# Patient Record
Sex: FEMALE | Race: WHITE | NOT HISPANIC OR LATINO | Employment: OTHER | URBAN - METROPOLITAN AREA
[De-identification: names, ages, dates, MRNs, and addresses within clinical notes are randomized per-mention and may not be internally consistent; named-entity substitution may affect disease eponyms.]

---

## 2021-01-01 ENCOUNTER — APPOINTMENT (OUTPATIENT)
Dept: CT IMAGING | Facility: HOSPITAL | Age: 81
End: 2021-01-01

## 2021-01-01 ENCOUNTER — DOCUMENTATION (OUTPATIENT)
Dept: NEUROSURGERY | Facility: CLINIC | Age: 81
End: 2021-01-01

## 2021-01-01 ENCOUNTER — HOSPITAL ENCOUNTER (INPATIENT)
Facility: HOSPITAL | Age: 81
LOS: 6 days | End: 2021-11-01
Attending: RADIOLOGY | Admitting: INTERNAL MEDICINE

## 2021-01-01 ENCOUNTER — APPOINTMENT (OUTPATIENT)
Dept: MRI IMAGING | Facility: HOSPITAL | Age: 81
End: 2021-01-01

## 2021-01-01 ENCOUNTER — APPOINTMENT (OUTPATIENT)
Dept: GENERAL RADIOLOGY | Facility: HOSPITAL | Age: 81
End: 2021-01-01

## 2021-01-01 ENCOUNTER — HOSPITAL ENCOUNTER (INPATIENT)
Facility: HOSPITAL | Age: 81
LOS: 2 days | End: 2021-11-03
Attending: INTERNAL MEDICINE | Admitting: INTERNAL MEDICINE

## 2021-01-01 ENCOUNTER — APPOINTMENT (OUTPATIENT)
Dept: CARDIOLOGY | Facility: HOSPITAL | Age: 81
End: 2021-01-01

## 2021-01-01 ENCOUNTER — ANCILLARY PROCEDURE (OUTPATIENT)
Dept: SPEECH THERAPY | Facility: HOSPITAL | Age: 81
End: 2021-01-01

## 2021-01-01 VITALS
OXYGEN SATURATION: 91 % | HEIGHT: 66 IN | RESPIRATION RATE: 16 BRPM | TEMPERATURE: 98.2 F | DIASTOLIC BLOOD PRESSURE: 61 MMHG | HEART RATE: 78 BPM | WEIGHT: 143.3 LBS | BODY MASS INDEX: 23.03 KG/M2 | SYSTOLIC BLOOD PRESSURE: 111 MMHG

## 2021-01-01 VITALS — BODY MASS INDEX: 22.98 KG/M2 | WEIGHT: 143 LBS | HEIGHT: 66 IN

## 2021-01-01 DIAGNOSIS — R13.10 DYSPHAGIA, UNSPECIFIED TYPE: ICD-10-CM

## 2021-01-01 DIAGNOSIS — R47.01 APHASIA: Primary | ICD-10-CM

## 2021-01-01 LAB
ALBUMIN SERPL-MCNC: 3 G/DL (ref 3.5–5.2)
ALBUMIN SERPL-MCNC: 3.4 G/DL (ref 3.5–5.2)
ALBUMIN SERPL-MCNC: 3.5 G/DL (ref 3.5–5.2)
ALBUMIN/GLOB SERPL: 1.4 G/DL
ALP SERPL-CCNC: 67 U/L (ref 39–117)
ALT SERPL W P-5'-P-CCNC: 14 U/L (ref 1–33)
ANION GAP SERPL CALCULATED.3IONS-SCNC: 12 MMOL/L (ref 5–15)
ANION GAP SERPL CALCULATED.3IONS-SCNC: 12 MMOL/L (ref 5–15)
ANION GAP SERPL CALCULATED.3IONS-SCNC: 13 MMOL/L (ref 5–15)
ARTERIAL PATENCY WRIST A: ABNORMAL
ARTERIAL PATENCY WRIST A: ABNORMAL
AST SERPL-CCNC: 26 U/L (ref 1–32)
ATMOSPHERIC PRESS: ABNORMAL MM[HG]
ATMOSPHERIC PRESS: ABNORMAL MM[HG]
BASE EXCESS BLDA CALC-SCNC: -3 MMOL/L (ref 0–2)
BASE EXCESS BLDA CALC-SCNC: -3.7 MMOL/L (ref 0–2)
BASOPHILS # BLD AUTO: 0.02 10*3/MM3 (ref 0–0.2)
BASOPHILS NFR BLD AUTO: 0.1 % (ref 0–1.5)
BDY SITE: ABNORMAL
BDY SITE: ABNORMAL
BH CV ECHO MEAS - AO MAX PG (FULL): 9.5 MMHG
BH CV ECHO MEAS - AO MAX PG: 12.2 MMHG
BH CV ECHO MEAS - AO MEAN PG (FULL): 3.7 MMHG
BH CV ECHO MEAS - AO MEAN PG: 6.1 MMHG
BH CV ECHO MEAS - AO V2 MAX: 175 CM/SEC
BH CV ECHO MEAS - AO V2 MEAN: 114.7 CM/SEC
BH CV ECHO MEAS - AO V2 VTI: 38.4 CM
BH CV ECHO MEAS - BSA(HAYCOCK): 1.7 M^2
BH CV ECHO MEAS - BSA: 1.7 M^2
BH CV ECHO MEAS - BZI_BMI: 21.8 KILOGRAMS/M^2
BH CV ECHO MEAS - BZI_METRIC_HEIGHT: 167.6 CM
BH CV ECHO MEAS - BZI_METRIC_WEIGHT: 61.2 KG
BH CV ECHO MEAS - EDV(CUBED): 25.2 ML
BH CV ECHO MEAS - EDV(MOD-SP2): 26 ML
BH CV ECHO MEAS - EDV(MOD-SP4): 49 ML
BH CV ECHO MEAS - EDV(TEICH): 33.1 ML
BH CV ECHO MEAS - EF(CUBED): 65.8 %
BH CV ECHO MEAS - EF(MOD-BP): 61 %
BH CV ECHO MEAS - EF(MOD-SP2): 57.7 %
BH CV ECHO MEAS - EF(MOD-SP4): 59.2 %
BH CV ECHO MEAS - EF(TEICH): 59.1 %
BH CV ECHO MEAS - ESV(CUBED): 8.6 ML
BH CV ECHO MEAS - ESV(MOD-SP2): 11 ML
BH CV ECHO MEAS - ESV(MOD-SP4): 20 ML
BH CV ECHO MEAS - ESV(TEICH): 13.6 ML
BH CV ECHO MEAS - FS: 30.1 %
BH CV ECHO MEAS - IVS/LVPW: 0.96
BH CV ECHO MEAS - IVSD: 0.86 CM
BH CV ECHO MEAS - LA DIMENSION: 2.9 CM
BH CV ECHO MEAS - LAD MAJOR: 5.8 CM
BH CV ECHO MEAS - LAT PEAK E' VEL: 3.9 CM/SEC
BH CV ECHO MEAS - LATERAL E/E' RATIO: 51.7
BH CV ECHO MEAS - LV DIASTOLIC VOL/BSA (35-75): 29 ML/M^2
BH CV ECHO MEAS - LV IVRT: 0.08 SEC
BH CV ECHO MEAS - LV MASS(C)D: 64.9 GRAMS
BH CV ECHO MEAS - LV MASS(C)DI: 38.3 GRAMS/M^2
BH CV ECHO MEAS - LV MAX PG: 2.7 MMHG
BH CV ECHO MEAS - LV MEAN PG: 2.4 MMHG
BH CV ECHO MEAS - LV SYSTOLIC VOL/BSA (12-30): 11.8 ML/M^2
BH CV ECHO MEAS - LV V1 MAX: 82.1 CM/SEC
BH CV ECHO MEAS - LV V1 MEAN: 77 CM/SEC
BH CV ECHO MEAS - LV V1 VTI: 23.8 CM
BH CV ECHO MEAS - LVIDD: 2.9 CM
BH CV ECHO MEAS - LVIDS: 2.1 CM
BH CV ECHO MEAS - LVLD AP2: 6.1 CM
BH CV ECHO MEAS - LVLD AP4: 7 CM
BH CV ECHO MEAS - LVLS AP2: 5.1 CM
BH CV ECHO MEAS - LVLS AP4: 5.5 CM
BH CV ECHO MEAS - LVPWD: 0.89 CM
BH CV ECHO MEAS - MED PEAK E' VEL: 4.1 CM/SEC
BH CV ECHO MEAS - MEDIAL E/E' RATIO: 50.4
BH CV ECHO MEAS - MV A MAX VEL: 98.7 CM/SEC
BH CV ECHO MEAS - MV DEC SLOPE: 556.4 CM/SEC^2
BH CV ECHO MEAS - MV DEC TIME: 0.36 SEC
BH CV ECHO MEAS - MV E MAX VEL: 210.5 CM/SEC
BH CV ECHO MEAS - MV E/A: 2.1
BH CV ECHO MEAS - MV MAX PG: 18.2 MMHG
BH CV ECHO MEAS - MV MEAN PG: 8.1 MMHG
BH CV ECHO MEAS - MV P1/2T MAX VEL: 221 CM/SEC
BH CV ECHO MEAS - MV P1/2T: 116.3 MSEC
BH CV ECHO MEAS - MV V2 MAX: 213.4 CM/SEC
BH CV ECHO MEAS - MV V2 MEAN: 135.3 CM/SEC
BH CV ECHO MEAS - MV V2 VTI: 72.9 CM
BH CV ECHO MEAS - MVA P1/2T LCG: 1 CM^2
BH CV ECHO MEAS - MVA(P1/2T): 1.9 CM^2
BH CV ECHO MEAS - RAP SYSTOLE: 3 MMHG
BH CV ECHO MEAS - RVSP: 41 MMHG
BH CV ECHO MEAS - SI(CUBED): 9.8 ML/M^2
BH CV ECHO MEAS - SI(MOD-SP2): 8.9 ML/M^2
BH CV ECHO MEAS - SI(MOD-SP4): 17.1 ML/M^2
BH CV ECHO MEAS - SI(TEICH): 11.6 ML/M^2
BH CV ECHO MEAS - SV(CUBED): 16.6 ML
BH CV ECHO MEAS - SV(MOD-SP2): 15 ML
BH CV ECHO MEAS - SV(MOD-SP4): 29 ML
BH CV ECHO MEAS - SV(TEICH): 19.6 ML
BH CV ECHO MEAS - TAPSE (>1.6): 2.1 CM
BH CV ECHO MEAS - TR MAX PG: 38 MMHG
BH CV ECHO MEAS - TR MAX VEL: 309 CM/SEC
BH CV ECHO MEASUREMENTS AVERAGE E/E' RATIO: 52.63
BH CV VAS BP RIGHT ARM: NORMAL MMHG
BH CV XLRA - RV BASE: 3.4 CM
BH CV XLRA - RV LENGTH: 6.2 CM
BH CV XLRA - RV MID: 1.9 CM
BH CV XLRA - TDI S': 10.2 CM/SEC
BILIRUB SERPL-MCNC: 0.6 MG/DL (ref 0–1.2)
BODY TEMPERATURE: 37 C
BUN SERPL-MCNC: 17 MG/DL (ref 8–23)
BUN SERPL-MCNC: 18 MG/DL (ref 8–23)
BUN SERPL-MCNC: 20 MG/DL (ref 8–23)
BUN/CREAT SERPL: 23.7 (ref 7–25)
BUN/CREAT SERPL: 25 (ref 7–25)
BUN/CREAT SERPL: 31.3 (ref 7–25)
CALCIUM SPEC-SCNC: 8.2 MG/DL (ref 8.6–10.5)
CALCIUM SPEC-SCNC: 8.5 MG/DL (ref 8.6–10.5)
CALCIUM SPEC-SCNC: 8.6 MG/DL (ref 8.6–10.5)
CHLORIDE SERPL-SCNC: 109 MMOL/L (ref 98–107)
CHLORIDE SERPL-SCNC: 110 MMOL/L (ref 98–107)
CHLORIDE SERPL-SCNC: 112 MMOL/L (ref 98–107)
CHOLEST SERPL-MCNC: 141 MG/DL (ref 0–200)
CO2 BLDA-SCNC: 20.4 MMOL/L (ref 22–33)
CO2 BLDA-SCNC: 21.4 MMOL/L (ref 22–33)
CO2 SERPL-SCNC: 17 MMOL/L (ref 22–29)
CO2 SERPL-SCNC: 18 MMOL/L (ref 22–29)
CO2 SERPL-SCNC: 19 MMOL/L (ref 22–29)
COHGB MFR BLD: 0.2 % (ref 0–2)
COHGB MFR BLD: ABNORMAL %
CREAT SERPL-MCNC: 0.64 MG/DL (ref 0.57–1)
CREAT SERPL-MCNC: 0.68 MG/DL (ref 0.57–1)
CREAT SERPL-MCNC: 0.76 MG/DL (ref 0.57–1)
DEPRECATED RDW RBC AUTO: 51.8 FL (ref 37–54)
DEPRECATED RDW RBC AUTO: 52.6 FL (ref 37–54)
DEPRECATED RDW RBC AUTO: 53.9 FL (ref 37–54)
EOSINOPHIL # BLD AUTO: 0 10*3/MM3 (ref 0–0.4)
EOSINOPHIL # BLD AUTO: 0 10*3/MM3 (ref 0–0.4)
EOSINOPHIL # BLD AUTO: 0.01 10*3/MM3 (ref 0–0.4)
EOSINOPHIL NFR BLD AUTO: 0 % (ref 0.3–6.2)
EOSINOPHIL NFR BLD AUTO: 0 % (ref 0.3–6.2)
EOSINOPHIL NFR BLD AUTO: 0.1 % (ref 0.3–6.2)
EPAP: 0
ERYTHROCYTE [DISTWIDTH] IN BLOOD BY AUTOMATED COUNT: 14 % (ref 12.3–15.4)
ERYTHROCYTE [DISTWIDTH] IN BLOOD BY AUTOMATED COUNT: 14.4 % (ref 12.3–15.4)
ERYTHROCYTE [DISTWIDTH] IN BLOOD BY AUTOMATED COUNT: 14.6 % (ref 12.3–15.4)
GFR SERPL CREATININE-BSD FRML MDRD: 73 ML/MIN/1.73
GFR SERPL CREATININE-BSD FRML MDRD: 83 ML/MIN/1.73
GFR SERPL CREATININE-BSD FRML MDRD: 89 ML/MIN/1.73
GLOBULIN UR ELPH-MCNC: 2.2 GM/DL
GLUCOSE BLDC GLUCOMTR-MCNC: 109 MG/DL (ref 70–130)
GLUCOSE BLDC GLUCOMTR-MCNC: 112 MG/DL (ref 70–130)
GLUCOSE BLDC GLUCOMTR-MCNC: 113 MG/DL (ref 70–130)
GLUCOSE BLDC GLUCOMTR-MCNC: 116 MG/DL (ref 70–130)
GLUCOSE BLDC GLUCOMTR-MCNC: 117 MG/DL (ref 70–130)
GLUCOSE BLDC GLUCOMTR-MCNC: 118 MG/DL (ref 70–130)
GLUCOSE BLDC GLUCOMTR-MCNC: 122 MG/DL (ref 70–130)
GLUCOSE BLDC GLUCOMTR-MCNC: 128 MG/DL (ref 70–130)
GLUCOSE BLDC GLUCOMTR-MCNC: 129 MG/DL (ref 70–130)
GLUCOSE BLDC GLUCOMTR-MCNC: 138 MG/DL (ref 70–130)
GLUCOSE BLDC GLUCOMTR-MCNC: 141 MG/DL (ref 70–130)
GLUCOSE BLDC GLUCOMTR-MCNC: 154 MG/DL (ref 70–130)
GLUCOSE BLDC GLUCOMTR-MCNC: 156 MG/DL (ref 70–130)
GLUCOSE BLDC GLUCOMTR-MCNC: 177 MG/DL (ref 70–130)
GLUCOSE BLDC GLUCOMTR-MCNC: 68 MG/DL (ref 70–130)
GLUCOSE BLDC GLUCOMTR-MCNC: 71 MG/DL (ref 70–130)
GLUCOSE BLDC GLUCOMTR-MCNC: 83 MG/DL (ref 70–130)
GLUCOSE BLDC GLUCOMTR-MCNC: 85 MG/DL (ref 70–130)
GLUCOSE BLDC GLUCOMTR-MCNC: 87 MG/DL (ref 70–130)
GLUCOSE BLDC GLUCOMTR-MCNC: 90 MG/DL (ref 70–130)
GLUCOSE SERPL-MCNC: 116 MG/DL (ref 65–99)
GLUCOSE SERPL-MCNC: 118 MG/DL (ref 65–99)
GLUCOSE SERPL-MCNC: 124 MG/DL (ref 65–99)
HBA1C MFR BLD: 5.2 % (ref 4.8–5.6)
HCO3 BLDA-SCNC: 19.6 MMOL/L (ref 20–26)
HCO3 BLDA-SCNC: 20.5 MMOL/L (ref 20–26)
HCT VFR BLD AUTO: 33.2 % (ref 34–46.6)
HCT VFR BLD AUTO: 38.4 % (ref 34–46.6)
HCT VFR BLD AUTO: 39.6 % (ref 34–46.6)
HCT VFR BLD CALC: 33.8 % (ref 38–51)
HCT VFR BLD CALC: 37 % (ref 38–51)
HDLC SERPL-MCNC: 42 MG/DL (ref 40–60)
HGB BLD-MCNC: 11.3 G/DL (ref 12–15.9)
HGB BLD-MCNC: 12.7 G/DL (ref 12–15.9)
HGB BLD-MCNC: 13.3 G/DL (ref 12–15.9)
HGB BLDA-MCNC: 11 G/DL (ref 14–18)
HGB BLDA-MCNC: 12.1 G/DL (ref 14–18)
IMM GRANULOCYTES # BLD AUTO: 0.09 10*3/MM3 (ref 0–0.05)
IMM GRANULOCYTES # BLD AUTO: 0.13 10*3/MM3 (ref 0–0.05)
IMM GRANULOCYTES # BLD AUTO: 0.13 10*3/MM3 (ref 0–0.05)
IMM GRANULOCYTES NFR BLD AUTO: 0.5 % (ref 0–0.5)
IMM GRANULOCYTES NFR BLD AUTO: 0.7 % (ref 0–0.5)
IMM GRANULOCYTES NFR BLD AUTO: 0.7 % (ref 0–0.5)
INHALED O2 CONCENTRATION: 100 %
INHALED O2 CONCENTRATION: 40 %
IPAP: 0
LDLC SERPL CALC-MCNC: 85 MG/DL (ref 0–100)
LDLC/HDLC SERPL: 2.03 {RATIO}
LEFT ATRIUM VOLUME INDEX: 30.7 ML/M^2
LEFT ATRIUM VOLUME: 52 ML
LYMPHOCYTES # BLD AUTO: 0.42 10*3/MM3 (ref 0.7–3.1)
LYMPHOCYTES # BLD AUTO: 0.63 10*3/MM3 (ref 0.7–3.1)
LYMPHOCYTES # BLD AUTO: 0.71 10*3/MM3 (ref 0.7–3.1)
LYMPHOCYTES NFR BLD AUTO: 2.3 % (ref 19.6–45.3)
LYMPHOCYTES NFR BLD AUTO: 3.3 % (ref 19.6–45.3)
LYMPHOCYTES NFR BLD AUTO: 3.6 % (ref 19.6–45.3)
MACROCYTES BLD QL SMEAR: NORMAL
MAGNESIUM SERPL-MCNC: 2 MG/DL (ref 1.6–2.4)
MAGNESIUM SERPL-MCNC: 2 MG/DL (ref 1.6–2.4)
MAGNESIUM SERPL-MCNC: 2.3 MG/DL (ref 1.6–2.4)
MAXIMAL PREDICTED HEART RATE: 139 BPM
MCH RBC QN AUTO: 33.3 PG (ref 26.6–33)
MCH RBC QN AUTO: 33.7 PG (ref 26.6–33)
MCH RBC QN AUTO: 34.6 PG (ref 26.6–33)
MCHC RBC AUTO-ENTMCNC: 33.1 G/DL (ref 31.5–35.7)
MCHC RBC AUTO-ENTMCNC: 33.6 G/DL (ref 31.5–35.7)
MCHC RBC AUTO-ENTMCNC: 34 G/DL (ref 31.5–35.7)
MCV RBC AUTO: 100.3 FL (ref 79–97)
MCV RBC AUTO: 100.8 FL (ref 79–97)
MCV RBC AUTO: 101.5 FL (ref 79–97)
METHGB BLD QL: 0.4 % (ref 0–1.5)
METHGB BLD QL: 0.7 % (ref 0–1.5)
MODALITY: ABNORMAL
MODALITY: ABNORMAL
MONOCYTES # BLD AUTO: 1.12 10*3/MM3 (ref 0.1–0.9)
MONOCYTES # BLD AUTO: 1.12 10*3/MM3 (ref 0.1–0.9)
MONOCYTES # BLD AUTO: 1.7 10*3/MM3 (ref 0.1–0.9)
MONOCYTES NFR BLD AUTO: 5.7 % (ref 5–12)
MONOCYTES NFR BLD AUTO: 6.1 % (ref 5–12)
MONOCYTES NFR BLD AUTO: 8.8 % (ref 5–12)
NEUTROPHILS NFR BLD AUTO: 16.8 10*3/MM3 (ref 1.7–7)
NEUTROPHILS NFR BLD AUTO: 16.81 10*3/MM3 (ref 1.7–7)
NEUTROPHILS NFR BLD AUTO: 17.62 10*3/MM3 (ref 1.7–7)
NEUTROPHILS NFR BLD AUTO: 87.3 % (ref 42.7–76)
NEUTROPHILS NFR BLD AUTO: 89.8 % (ref 42.7–76)
NEUTROPHILS NFR BLD AUTO: 90.8 % (ref 42.7–76)
NOTE: ABNORMAL
NOTE: ABNORMAL
NRBC BLD AUTO-RTO: 0 /100 WBC (ref 0–0.2)
NT-PROBNP SERPL-MCNC: 5486 PG/ML (ref 0–1800)
OXYHGB MFR BLDV: 98.8 % (ref 94–99)
OXYHGB MFR BLDV: 98.9 % (ref 94–99)
PAW @ PEAK INSP FLOW SETTING VENT: 0 CMH2O
PCO2 BLDA: 29.3 MM HG (ref 35–45)
PCO2 BLDA: 30.7 MM HG (ref 35–45)
PCO2 TEMP ADJ BLD: 29.3 MM HG (ref 35–45)
PCO2 TEMP ADJ BLD: ABNORMAL MM[HG]
PH BLDA: 7.43 PH UNITS (ref 7.35–7.45)
PH BLDA: 7.43 PH UNITS (ref 7.35–7.45)
PH, TEMP CORRECTED: 7.43 PH UNITS
PH, TEMP CORRECTED: ABNORMAL
PHOSPHATE SERPL-MCNC: 3.2 MG/DL (ref 2.5–4.5)
PHOSPHATE SERPL-MCNC: 3.2 MG/DL (ref 2.5–4.5)
PHOSPHATE SERPL-MCNC: 3.8 MG/DL (ref 2.5–4.5)
PLAT MORPH BLD: NORMAL
PLATELET # BLD AUTO: 103 10*3/MM3 (ref 140–450)
PLATELET # BLD AUTO: 165 10*3/MM3 (ref 140–450)
PLATELET # BLD AUTO: 177 10*3/MM3 (ref 140–450)
PMV BLD AUTO: 10 FL (ref 6–12)
PMV BLD AUTO: 10.1 FL (ref 6–12)
PMV BLD AUTO: 11.3 FL (ref 6–12)
PO2 BLDA: 354 MM HG (ref 83–108)
PO2 BLDA: 431 MM HG (ref 83–108)
PO2 TEMP ADJ BLD: 431 MM HG (ref 83–108)
PO2 TEMP ADJ BLD: ABNORMAL MM[HG]
POTASSIUM SERPL-SCNC: 4 MMOL/L (ref 3.5–5.2)
POTASSIUM SERPL-SCNC: 4 MMOL/L (ref 3.5–5.2)
POTASSIUM SERPL-SCNC: 4.1 MMOL/L (ref 3.5–5.2)
PROT SERPL-MCNC: 5.2 G/DL (ref 6–8.5)
QT INTERVAL: 318 MS
QT INTERVAL: 380 MS
QT INTERVAL: 438 MS
QT INTERVAL: 440 MS
QT INTERVAL: 442 MS
QTC INTERVAL: 369 MS
QTC INTERVAL: 457 MS
QTC INTERVAL: 462 MS
QTC INTERVAL: 467 MS
QTC INTERVAL: 477 MS
RBC # BLD AUTO: 3.27 10*6/MM3 (ref 3.77–5.28)
RBC # BLD AUTO: 3.81 10*6/MM3 (ref 3.77–5.28)
RBC # BLD AUTO: 3.95 10*6/MM3 (ref 3.77–5.28)
SARS-COV-2 RDRP RESP QL NAA+PROBE: NORMAL
SODIUM SERPL-SCNC: 140 MMOL/L (ref 136–145)
SODIUM SERPL-SCNC: 141 MMOL/L (ref 136–145)
SODIUM SERPL-SCNC: 141 MMOL/L (ref 136–145)
STRESS TARGET HR: 118 BPM
TOTAL RATE: 0 BREATHS/MINUTE
TRIGL SERPL-MCNC: 68 MG/DL (ref 0–150)
TSH SERPL DL<=0.05 MIU/L-ACNC: 0.95 UIU/ML (ref 0.27–4.2)
VENTILATOR MODE: ABNORMAL
VLDLC SERPL-MCNC: 14 MG/DL (ref 5–40)
WBC # BLD AUTO: 18.5 10*3/MM3 (ref 3.4–10.8)
WBC # BLD AUTO: 19.24 10*3/MM3 (ref 3.4–10.8)
WBC # BLD AUTO: 19.61 10*3/MM3 (ref 3.4–10.8)
WBC MORPH BLD: NORMAL

## 2021-01-01 PROCEDURE — 94002 VENT MGMT INPAT INIT DAY: CPT

## 2021-01-01 PROCEDURE — 0BH17EZ INSERTION OF ENDOTRACHEAL AIRWAY INTO TRACHEA, VIA NATURAL OR ARTIFICIAL OPENING: ICD-10-PCS | Performed by: INTERNAL MEDICINE

## 2021-01-01 PROCEDURE — C1887 CATHETER, GUIDING: HCPCS | Performed by: RADIOLOGY

## 2021-01-01 PROCEDURE — 97162 PT EVAL MOD COMPLEX 30 MIN: CPT

## 2021-01-01 PROCEDURE — 25010000002 AMIODARONE PER 30 MG: Performed by: RADIOLOGY

## 2021-01-01 PROCEDURE — 25010000002 MIDAZOLAM PER 1 MG: Performed by: RADIOLOGY

## 2021-01-01 PROCEDURE — 70450 CT HEAD/BRAIN W/O DYE: CPT

## 2021-01-01 PROCEDURE — 93010 ELECTROCARDIOGRAM REPORT: CPT | Performed by: INTERNAL MEDICINE

## 2021-01-01 PROCEDURE — 25010000002 LORAZEPAM PER 2 MG: Performed by: INTERNAL MEDICINE

## 2021-01-01 PROCEDURE — 82375 ASSAY CARBOXYHB QUANT: CPT

## 2021-01-01 PROCEDURE — 94799 UNLISTED PULMONARY SVC/PX: CPT

## 2021-01-01 PROCEDURE — 83880 ASSAY OF NATRIURETIC PEPTIDE: CPT | Performed by: INTERNAL MEDICINE

## 2021-01-01 PROCEDURE — 99232 SBSQ HOSP IP/OBS MODERATE 35: CPT | Performed by: INTERNAL MEDICINE

## 2021-01-01 PROCEDURE — C1760 CLOSURE DEV, VASC: HCPCS | Performed by: RADIOLOGY

## 2021-01-01 PROCEDURE — 71045 X-RAY EXAM CHEST 1 VIEW: CPT

## 2021-01-01 PROCEDURE — C1773 RET DEV, INSERTABLE: HCPCS | Performed by: RADIOLOGY

## 2021-01-01 PROCEDURE — 03CG3Z7 EXTIRPATION OF MATTER FROM INTRACRANIAL ARTERY USING STENT RETRIEVER, PERCUTANEOUS APPROACH: ICD-10-PCS | Performed by: RADIOLOGY

## 2021-01-01 PROCEDURE — 93005 ELECTROCARDIOGRAM TRACING: CPT | Performed by: NURSE PRACTITIONER

## 2021-01-01 PROCEDURE — 70551 MRI BRAIN STEM W/O DYE: CPT

## 2021-01-01 PROCEDURE — 4A03X5D MEASUREMENT OF ARTERIAL FLOW, INTRACRANIAL, EXTERNAL APPROACH: ICD-10-PCS | Performed by: CLINICAL NURSE SPECIALIST

## 2021-01-01 PROCEDURE — C1894 INTRO/SHEATH, NON-LASER: HCPCS | Performed by: RADIOLOGY

## 2021-01-01 PROCEDURE — 83735 ASSAY OF MAGNESIUM: CPT | Performed by: INTERNAL MEDICINE

## 2021-01-01 PROCEDURE — 99231 SBSQ HOSP IP/OBS SF/LOW 25: CPT | Performed by: INTERNAL MEDICINE

## 2021-01-01 PROCEDURE — 97116 GAIT TRAINING THERAPY: CPT

## 2021-01-01 PROCEDURE — 99222 1ST HOSP IP/OBS MODERATE 55: CPT | Performed by: INTERNAL MEDICINE

## 2021-01-01 PROCEDURE — 74018 RADEX ABDOMEN 1 VIEW: CPT

## 2021-01-01 PROCEDURE — 0 IODIXANOL PER 1 ML: Performed by: RADIOLOGY

## 2021-01-01 PROCEDURE — 70498 CT ANGIOGRAPHY NECK: CPT

## 2021-01-01 PROCEDURE — 99291 CRITICAL CARE FIRST HOUR: CPT | Performed by: INTERNAL MEDICINE

## 2021-01-01 PROCEDURE — 92610 EVALUATE SWALLOWING FUNCTION: CPT

## 2021-01-01 PROCEDURE — 97165 OT EVAL LOW COMPLEX 30 MIN: CPT

## 2021-01-01 PROCEDURE — C1769 GUIDE WIRE: HCPCS | Performed by: RADIOLOGY

## 2021-01-01 PROCEDURE — 25010000002 FUROSEMIDE PER 20 MG: Performed by: NURSE PRACTITIONER

## 2021-01-01 PROCEDURE — 84443 ASSAY THYROID STIM HORMONE: CPT | Performed by: INTERNAL MEDICINE

## 2021-01-01 PROCEDURE — 82962 GLUCOSE BLOOD TEST: CPT

## 2021-01-01 PROCEDURE — 25010000002 FENTANYL CITRATE (PF) 50 MCG/ML SOLUTION: Performed by: RADIOLOGY

## 2021-01-01 PROCEDURE — 99232 SBSQ HOSP IP/OBS MODERATE 35: CPT | Performed by: PSYCHIATRY & NEUROLOGY

## 2021-01-01 PROCEDURE — 83050 HGB METHEMOGLOBIN QUAN: CPT

## 2021-01-01 PROCEDURE — 92523 SPEECH SOUND LANG COMPREHEN: CPT | Performed by: SPEECH-LANGUAGE PATHOLOGIST

## 2021-01-01 PROCEDURE — 99231 SBSQ HOSP IP/OBS SF/LOW 25: CPT | Performed by: PSYCHIATRY & NEUROLOGY

## 2021-01-01 PROCEDURE — 25010000002 MORPHINE PER 10 MG: Performed by: FAMILY MEDICINE

## 2021-01-01 PROCEDURE — 70496 CT ANGIOGRAPHY HEAD: CPT

## 2021-01-01 PROCEDURE — 84100 ASSAY OF PHOSPHORUS: CPT | Performed by: INTERNAL MEDICINE

## 2021-01-01 PROCEDURE — 93306 TTE W/DOPPLER COMPLETE: CPT

## 2021-01-01 PROCEDURE — 80069 RENAL FUNCTION PANEL: CPT | Performed by: INTERNAL MEDICINE

## 2021-01-01 PROCEDURE — 25010000002 MORPHINE PER 10 MG: Performed by: INTERNAL MEDICINE

## 2021-01-01 PROCEDURE — 85025 COMPLETE CBC W/AUTO DIFF WBC: CPT | Performed by: INTERNAL MEDICINE

## 2021-01-01 PROCEDURE — 97530 THERAPEUTIC ACTIVITIES: CPT

## 2021-01-01 PROCEDURE — 99233 SBSQ HOSP IP/OBS HIGH 50: CPT | Performed by: INTERNAL MEDICINE

## 2021-01-01 PROCEDURE — 25010000002 LORAZEPAM PER 2 MG: Performed by: NURSE PRACTITIONER

## 2021-01-01 PROCEDURE — 61645 PERQ ART M-THROMBECT &/NFS: CPT | Performed by: RADIOLOGY

## 2021-01-01 PROCEDURE — 92507 TX SP LANG VOICE COMM INDIV: CPT

## 2021-01-01 PROCEDURE — 25010000002 FENTANYL CITRATE (PF) 2500 MCG/50ML SOLUTION: Performed by: INTERNAL MEDICINE

## 2021-01-01 PROCEDURE — C2628 CATHETER, OCCLUSION: HCPCS | Performed by: RADIOLOGY

## 2021-01-01 PROCEDURE — 94003 VENT MGMT INPAT SUBQ DAY: CPT

## 2021-01-01 PROCEDURE — 36600 WITHDRAWAL OF ARTERIAL BLOOD: CPT

## 2021-01-01 PROCEDURE — 25010000002 KETOROLAC TROMETHAMINE PER 15 MG: Performed by: NURSE PRACTITIONER

## 2021-01-01 PROCEDURE — 25010000002 LORAZEPAM PER 2 MG

## 2021-01-01 PROCEDURE — 05HY33Z INSERTION OF INFUSION DEVICE INTO UPPER VEIN, PERCUTANEOUS APPROACH: ICD-10-PCS | Performed by: INTERNAL MEDICINE

## 2021-01-01 PROCEDURE — 93005 ELECTROCARDIOGRAM TRACING: CPT | Performed by: INTERNAL MEDICINE

## 2021-01-01 PROCEDURE — B317YZZ FLUOROSCOPY OF LEFT INTERNAL CAROTID ARTERY USING OTHER CONTRAST: ICD-10-PCS | Performed by: RADIOLOGY

## 2021-01-01 PROCEDURE — 82805 BLOOD GASES W/O2 SATURATION: CPT

## 2021-01-01 PROCEDURE — 97535 SELF CARE MNGMENT TRAINING: CPT

## 2021-01-01 PROCEDURE — 85007 BL SMEAR W/DIFF WBC COUNT: CPT | Performed by: INTERNAL MEDICINE

## 2021-01-01 PROCEDURE — 99232 SBSQ HOSP IP/OBS MODERATE 35: CPT | Performed by: NURSE PRACTITIONER

## 2021-01-01 PROCEDURE — 03CL3Z7 EXTIRPATION OF MATTER FROM LEFT INTERNAL CAROTID ARTERY USING STENT RETRIEVER, PERCUTANEOUS APPROACH: ICD-10-PCS | Performed by: RADIOLOGY

## 2021-01-01 PROCEDURE — 99291 CRITICAL CARE FIRST HOUR: CPT

## 2021-01-01 PROCEDURE — 92610 EVALUATE SWALLOWING FUNCTION: CPT | Performed by: SPEECH-LANGUAGE PATHOLOGIST

## 2021-01-01 PROCEDURE — 80053 COMPREHEN METABOLIC PANEL: CPT | Performed by: INTERNAL MEDICINE

## 2021-01-01 PROCEDURE — 80061 LIPID PANEL: CPT

## 2021-01-01 PROCEDURE — 87635 SARS-COV-2 COVID-19 AMP PRB: CPT | Performed by: RADIOLOGY

## 2021-01-01 PROCEDURE — 0 IOPAMIDOL PER 1 ML: Performed by: INTERNAL MEDICINE

## 2021-01-01 PROCEDURE — 92612 ENDOSCOPY SWALLOW (FEES) VID: CPT

## 2021-01-01 PROCEDURE — 25010000002 AMIODARONE IN DEXTROSE 5% 360-4.14 MG/200ML-% SOLUTION

## 2021-01-01 PROCEDURE — C1894 INTRO/SHEATH, NON-LASER: HCPCS

## 2021-01-01 PROCEDURE — 25010000002 ALTEPLASE 2 MG RECONSTITUTED SOLUTION: Performed by: RADIOLOGY

## 2021-01-01 PROCEDURE — 5A1945Z RESPIRATORY VENTILATION, 24-96 CONSECUTIVE HOURS: ICD-10-PCS | Performed by: INTERNAL MEDICINE

## 2021-01-01 PROCEDURE — 25010000002 MIDAZOLAM PER 1 MG

## 2021-01-01 PROCEDURE — 83036 HEMOGLOBIN GLYCOSYLATED A1C: CPT

## 2021-01-01 PROCEDURE — 25010000002 MORPHINE PER 10 MG: Performed by: NURSE PRACTITIONER

## 2021-01-01 PROCEDURE — B316YZZ FLUOROSCOPY OF RIGHT INTERNAL CAROTID ARTERY USING OTHER CONTRAST: ICD-10-PCS | Performed by: RADIOLOGY

## 2021-01-01 PROCEDURE — 25010000002 AMIODARONE IN DEXTROSE 5% 360-4.14 MG/200ML-% SOLUTION: Performed by: NURSE PRACTITIONER

## 2021-01-01 PROCEDURE — C1751 CATH, INF, PER/CENT/MIDLINE: HCPCS

## 2021-01-01 PROCEDURE — 93306 TTE W/DOPPLER COMPLETE: CPT | Performed by: INTERNAL MEDICINE

## 2021-01-01 DEVICE — STNT RETRV SOLITAIREX REVASCULARIZATION 3X20MM 10MM 150CM: Type: IMPLANTABLE DEVICE | Status: FUNCTIONAL

## 2021-01-01 DEVICE — STNT RETRV SOLITAIREX REVASCULARIZATION 4X40MM 130CM: Type: IMPLANTABLE DEVICE | Status: FUNCTIONAL

## 2021-01-01 RX ORDER — GLYCOPYRROLATE 0.2 MG/ML
0.4 INJECTION INTRAMUSCULAR; INTRAVENOUS EVERY 4 HOURS PRN
Status: CANCELLED | OUTPATIENT
Start: 2021-01-01

## 2021-01-01 RX ORDER — POLYVINYL ALCOHOL 14 MG/ML
2 SOLUTION/ DROPS OPHTHALMIC
Status: DISCONTINUED | OUTPATIENT
Start: 2021-01-01 | End: 2021-01-01 | Stop reason: HOSPADM

## 2021-01-01 RX ORDER — SODIUM CHLORIDE 0.9 % (FLUSH) 0.9 %
10 SYRINGE (ML) INJECTION EVERY 12 HOURS SCHEDULED
Status: CANCELLED | OUTPATIENT
Start: 2021-01-01

## 2021-01-01 RX ORDER — MIDAZOLAM HYDROCHLORIDE 1 MG/ML
INJECTION INTRAMUSCULAR; INTRAVENOUS AS NEEDED
Status: DISCONTINUED | OUTPATIENT
Start: 2021-01-01 | End: 2021-01-01 | Stop reason: HOSPADM

## 2021-01-01 RX ORDER — LEVOTHYROXINE SODIUM 20 UG/ML
120 INJECTION, SOLUTION INTRAVENOUS
Status: CANCELLED | OUTPATIENT
Start: 2021-01-01

## 2021-01-01 RX ORDER — AMIODARONE HYDROCHLORIDE 200 MG/1
200 TABLET ORAL EVERY 8 HOURS SCHEDULED
Status: DISCONTINUED | OUTPATIENT
Start: 2021-01-01 | End: 2021-01-01

## 2021-01-01 RX ORDER — AMIODARONE HYDROCHLORIDE 200 MG/1
200 TABLET ORAL DAILY
Status: DISCONTINUED | OUTPATIENT
Start: 2021-11-18 | End: 2021-01-01

## 2021-01-01 RX ORDER — LORAZEPAM 2 MG/ML
0.5 INJECTION INTRAMUSCULAR 2 TIMES DAILY
Status: DISCONTINUED | OUTPATIENT
Start: 2021-01-01 | End: 2021-01-01

## 2021-01-01 RX ORDER — ASPIRIN 300 MG/1
300 SUPPOSITORY RECTAL DAILY
Status: DISCONTINUED | OUTPATIENT
Start: 2021-01-01 | End: 2021-01-01

## 2021-01-01 RX ORDER — DEXTROSE MONOHYDRATE 25 G/50ML
25 INJECTION, SOLUTION INTRAVENOUS
Status: DISCONTINUED | OUTPATIENT
Start: 2021-01-01 | End: 2021-01-01 | Stop reason: HOSPADM

## 2021-01-01 RX ORDER — IODIXANOL 320 MG/ML
INJECTION, SOLUTION INTRAVASCULAR AS NEEDED
Status: DISCONTINUED | OUTPATIENT
Start: 2021-01-01 | End: 2021-01-01 | Stop reason: HOSPADM

## 2021-01-01 RX ORDER — AMIODARONE HYDROCHLORIDE 50 MG/ML
INJECTION, SOLUTION INTRAVENOUS AS NEEDED
Status: DISCONTINUED | OUTPATIENT
Start: 2021-01-01 | End: 2021-01-01 | Stop reason: HOSPADM

## 2021-01-01 RX ORDER — MORPHINE SULFATE 4 MG/ML
4 INJECTION, SOLUTION INTRAMUSCULAR; INTRAVENOUS
Status: CANCELLED | OUTPATIENT
Start: 2021-01-01 | End: 2021-11-10

## 2021-01-01 RX ORDER — LEVOTHYROXINE SODIUM 0.15 MG/1
150 TABLET ORAL
Status: DISCONTINUED | OUTPATIENT
Start: 2021-01-01 | End: 2021-01-01

## 2021-01-01 RX ORDER — SODIUM CHLORIDE 0.9 % (FLUSH) 0.9 %
10 SYRINGE (ML) INJECTION AS NEEDED
Status: CANCELLED | OUTPATIENT
Start: 2021-01-01

## 2021-01-01 RX ORDER — SODIUM CHLORIDE 0.9 % (FLUSH) 0.9 %
10 SYRINGE (ML) INJECTION EVERY 12 HOURS SCHEDULED
Status: DISCONTINUED | OUTPATIENT
Start: 2021-01-01 | End: 2021-01-01

## 2021-01-01 RX ORDER — LEVOTHYROXINE SODIUM 0.15 MG/1
150 TABLET ORAL DAILY
COMMUNITY

## 2021-01-01 RX ORDER — FENTANYL CITRATE 50 UG/ML
INJECTION, SOLUTION INTRAMUSCULAR; INTRAVENOUS AS NEEDED
Status: DISCONTINUED | OUTPATIENT
Start: 2021-01-01 | End: 2021-01-01 | Stop reason: HOSPADM

## 2021-01-01 RX ORDER — SODIUM CHLORIDE 0.9 % (FLUSH) 0.9 %
10 SYRINGE (ML) INJECTION AS NEEDED
Status: DISCONTINUED | OUTPATIENT
Start: 2021-01-01 | End: 2021-01-01

## 2021-01-01 RX ORDER — TIZANIDINE 4 MG/1
4 TABLET ORAL 2 TIMES DAILY
COMMUNITY

## 2021-01-01 RX ORDER — MIDAZOLAM HYDROCHLORIDE 1 MG/ML
INJECTION INTRAMUSCULAR; INTRAVENOUS
Status: COMPLETED
Start: 2021-01-01 | End: 2021-01-01

## 2021-01-01 RX ORDER — SCOLOPAMINE TRANSDERMAL SYSTEM 1 MG/1
1 PATCH, EXTENDED RELEASE TRANSDERMAL
Status: DISCONTINUED | OUTPATIENT
Start: 2021-01-01 | End: 2021-01-01 | Stop reason: HOSPADM

## 2021-01-01 RX ORDER — ASPIRIN 81 MG/1
81 TABLET, CHEWABLE ORAL DAILY
Status: DISCONTINUED | OUTPATIENT
Start: 2021-01-01 | End: 2021-01-01

## 2021-01-01 RX ORDER — LISINOPRIL 20 MG/1
20 TABLET ORAL DAILY
COMMUNITY

## 2021-01-01 RX ORDER — MORPHINE SULFATE 4 MG/ML
4 INJECTION, SOLUTION INTRAMUSCULAR; INTRAVENOUS
Status: DISCONTINUED | OUTPATIENT
Start: 2021-01-01 | End: 2021-01-01

## 2021-01-01 RX ORDER — LORAZEPAM 2 MG/ML
1 INJECTION INTRAMUSCULAR EVERY 4 HOURS PRN
Status: CANCELLED | OUTPATIENT
Start: 2021-01-01 | End: 2021-11-05

## 2021-01-01 RX ORDER — LORAZEPAM 2 MG/ML
1 INJECTION INTRAMUSCULAR EVERY 4 HOURS PRN
Status: DISCONTINUED | OUTPATIENT
Start: 2021-01-01 | End: 2021-01-01 | Stop reason: HOSPADM

## 2021-01-01 RX ORDER — HALOPERIDOL 5 MG/ML
1 INJECTION INTRAMUSCULAR EVERY 4 HOURS PRN
Status: DISCONTINUED | OUTPATIENT
Start: 2021-01-01 | End: 2021-01-01 | Stop reason: HOSPADM

## 2021-01-01 RX ORDER — LORAZEPAM 2 MG/ML
INJECTION INTRAMUSCULAR
Status: COMPLETED
Start: 2021-01-01 | End: 2021-01-01

## 2021-01-01 RX ORDER — ATORVASTATIN CALCIUM 40 MG/1
40 TABLET, FILM COATED ORAL NIGHTLY
COMMUNITY

## 2021-01-01 RX ORDER — METOPROLOL TARTRATE 5 MG/5ML
5 INJECTION INTRAVENOUS EVERY 6 HOURS
Status: DISCONTINUED | OUTPATIENT
Start: 2021-01-01 | End: 2021-01-01

## 2021-01-01 RX ORDER — ZOLPIDEM TARTRATE 10 MG/1
10 TABLET ORAL NIGHTLY PRN
COMMUNITY

## 2021-01-01 RX ORDER — SODIUM CHLORIDE 9 MG/ML
9 INJECTION, SOLUTION INTRAVENOUS CONTINUOUS
Status: DISCONTINUED | OUTPATIENT
Start: 2021-01-01 | End: 2021-01-01

## 2021-01-01 RX ORDER — CHLORHEXIDINE GLUCONATE 0.12 MG/ML
15 RINSE ORAL EVERY 12 HOURS SCHEDULED
Status: DISCONTINUED | OUTPATIENT
Start: 2021-01-01 | End: 2021-01-01

## 2021-01-01 RX ORDER — AMIODARONE HYDROCHLORIDE 200 MG/1
200 TABLET ORAL EVERY 12 HOURS
Status: DISCONTINUED | OUTPATIENT
Start: 2021-11-04 | End: 2021-01-01

## 2021-01-01 RX ORDER — SODIUM CHLORIDE 0.9 % (FLUSH) 0.9 %
10 SYRINGE (ML) INJECTION EVERY 12 HOURS SCHEDULED
Status: DISCONTINUED | OUTPATIENT
Start: 2021-01-01 | End: 2021-01-01 | Stop reason: HOSPADM

## 2021-01-01 RX ORDER — DEXTROSE MONOHYDRATE 25 G/50ML
25 INJECTION, SOLUTION INTRAVENOUS
Status: CANCELLED | OUTPATIENT
Start: 2021-01-01

## 2021-01-01 RX ORDER — FENTANYL CITRATE-0.9 % NACL/PF 10 MCG/ML
50-300 PLASTIC BAG, INJECTION (ML) INTRAVENOUS
Status: CANCELLED | OUTPATIENT
Start: 2021-01-01 | End: 2021-11-04

## 2021-01-01 RX ORDER — GLYCOPYRROLATE 0.2 MG/ML
0.2 INJECTION INTRAMUSCULAR; INTRAVENOUS EVERY 6 HOURS PRN
Status: DISCONTINUED | OUTPATIENT
Start: 2021-01-01 | End: 2021-01-01 | Stop reason: HOSPADM

## 2021-01-01 RX ORDER — FUROSEMIDE 10 MG/ML
20 INJECTION INTRAMUSCULAR; INTRAVENOUS EVERY 6 HOURS
Status: DISCONTINUED | OUTPATIENT
Start: 2021-01-01 | End: 2021-01-01 | Stop reason: HOSPADM

## 2021-01-01 RX ORDER — SODIUM CHLORIDE 9 MG/ML
50 INJECTION, SOLUTION INTRAVENOUS CONTINUOUS
Status: DISCONTINUED | OUTPATIENT
Start: 2021-01-01 | End: 2021-01-01

## 2021-01-01 RX ORDER — AMIODARONE HYDROCHLORIDE 200 MG/1
200 TABLET ORAL EVERY 12 HOURS
Status: DISCONTINUED | OUTPATIENT
Start: 2021-11-05 | End: 2021-01-01

## 2021-01-01 RX ORDER — ATORVASTATIN CALCIUM 40 MG/1
80 TABLET, FILM COATED ORAL NIGHTLY
Status: DISCONTINUED | OUTPATIENT
Start: 2021-01-01 | End: 2021-01-01

## 2021-01-01 RX ORDER — ETOMIDATE 2 MG/ML
INJECTION INTRAVENOUS
Status: COMPLETED
Start: 2021-01-01 | End: 2021-01-01

## 2021-01-01 RX ORDER — MORPHINE SULFATE 2 MG/ML
6 INJECTION, SOLUTION INTRAMUSCULAR; INTRAVENOUS EVERY 4 HOURS
Status: DISCONTINUED | OUTPATIENT
Start: 2021-01-01 | End: 2021-01-01 | Stop reason: HOSPADM

## 2021-01-01 RX ORDER — LORAZEPAM 2 MG/ML
2 INJECTION INTRAMUSCULAR
Status: DISCONTINUED | OUTPATIENT
Start: 2021-01-01 | End: 2021-01-01 | Stop reason: HOSPADM

## 2021-01-01 RX ORDER — LEVOTHYROXINE SODIUM 20 UG/ML
120 INJECTION, SOLUTION INTRAVENOUS
Status: DISCONTINUED | OUTPATIENT
Start: 2021-01-01 | End: 2021-01-01 | Stop reason: HOSPADM

## 2021-01-01 RX ORDER — METOPROLOL TARTRATE 5 MG/5ML
2.5 INJECTION INTRAVENOUS EVERY 6 HOURS
Status: DISCONTINUED | OUTPATIENT
Start: 2021-01-01 | End: 2021-01-01

## 2021-01-01 RX ORDER — KETOROLAC TROMETHAMINE 15 MG/ML
15 INJECTION, SOLUTION INTRAMUSCULAR; INTRAVENOUS EVERY 6 HOURS PRN
Status: DISCONTINUED | OUTPATIENT
Start: 2021-01-01 | End: 2021-01-01 | Stop reason: HOSPADM

## 2021-01-01 RX ORDER — AMIODARONE HYDROCHLORIDE 200 MG/1
200 TABLET ORAL EVERY 8 HOURS
Status: DISCONTINUED | OUTPATIENT
Start: 2021-01-01 | End: 2021-01-01

## 2021-01-01 RX ORDER — SULFAMETHOXAZOLE AND TRIMETHOPRIM 800; 160 MG/1; MG/1
1 TABLET ORAL DAILY
COMMUNITY

## 2021-01-01 RX ORDER — MORPHINE SULFATE 4 MG/ML
4 INJECTION, SOLUTION INTRAMUSCULAR; INTRAVENOUS
Status: DISCONTINUED | OUTPATIENT
Start: 2021-01-01 | End: 2021-01-01 | Stop reason: HOSPADM

## 2021-01-01 RX ORDER — FUROSEMIDE 10 MG/ML
20 INJECTION INTRAMUSCULAR; INTRAVENOUS EVERY 6 HOURS PRN
Status: DISCONTINUED | OUTPATIENT
Start: 2021-01-01 | End: 2021-01-01 | Stop reason: HOSPADM

## 2021-01-01 RX ORDER — HYDROCODONE BITARTRATE AND ACETAMINOPHEN 5; 325 MG/1; MG/1
1 TABLET ORAL 2 TIMES DAILY
COMMUNITY

## 2021-01-01 RX ORDER — OMEPRAZOLE 40 MG/1
40 CAPSULE, DELAYED RELEASE ORAL DAILY
COMMUNITY

## 2021-01-01 RX ORDER — AMLODIPINE BESYLATE 5 MG/1
5 TABLET ORAL DAILY
COMMUNITY

## 2021-01-01 RX ORDER — LIDOCAINE HYDROCHLORIDE 10 MG/ML
INJECTION, SOLUTION EPIDURAL; INFILTRATION; INTRACAUDAL; PERINEURAL AS NEEDED
Status: DISCONTINUED | OUTPATIENT
Start: 2021-01-01 | End: 2021-01-01 | Stop reason: HOSPADM

## 2021-01-01 RX ORDER — MORPHINE SULFATE 4 MG/ML
4 INJECTION, SOLUTION INTRAMUSCULAR; INTRAVENOUS EVERY 6 HOURS
Status: DISCONTINUED | OUTPATIENT
Start: 2021-01-01 | End: 2021-01-01

## 2021-01-01 RX ORDER — ACETAMINOPHEN 650 MG/1
650 SUPPOSITORY RECTAL EVERY 4 HOURS PRN
Status: DISCONTINUED | OUTPATIENT
Start: 2021-01-01 | End: 2021-01-01 | Stop reason: HOSPADM

## 2021-01-01 RX ORDER — FAMOTIDINE 10 MG/ML
20 INJECTION, SOLUTION INTRAVENOUS 2 TIMES DAILY
Status: DISCONTINUED | OUTPATIENT
Start: 2021-01-01 | End: 2021-01-01

## 2021-01-01 RX ORDER — MORPHINE SULFATE 2 MG/ML
6 INJECTION, SOLUTION INTRAMUSCULAR; INTRAVENOUS
Status: DISCONTINUED | OUTPATIENT
Start: 2021-01-01 | End: 2021-01-01 | Stop reason: HOSPADM

## 2021-01-01 RX ORDER — ONDANSETRON 2 MG/ML
4 INJECTION INTRAMUSCULAR; INTRAVENOUS EVERY 6 HOURS PRN
Status: DISCONTINUED | OUTPATIENT
Start: 2021-01-01 | End: 2021-01-01 | Stop reason: HOSPADM

## 2021-01-01 RX ORDER — LORAZEPAM 2 MG/ML
0.5 INJECTION INTRAMUSCULAR EVERY 4 HOURS PRN
Status: DISCONTINUED | OUTPATIENT
Start: 2021-01-01 | End: 2021-01-01

## 2021-01-01 RX ORDER — GLYCOPYRROLATE 0.2 MG/ML
0.4 INJECTION INTRAMUSCULAR; INTRAVENOUS EVERY 4 HOURS PRN
Status: DISCONTINUED | OUTPATIENT
Start: 2021-01-01 | End: 2021-01-01 | Stop reason: HOSPADM

## 2021-01-01 RX ORDER — DIGOXIN 125 MCG
125 TABLET ORAL
Status: DISCONTINUED | OUTPATIENT
Start: 2021-01-01 | End: 2021-01-01

## 2021-01-01 RX ORDER — AMIODARONE HYDROCHLORIDE 200 MG/1
200 TABLET ORAL DAILY
Status: DISCONTINUED | OUTPATIENT
Start: 2021-11-19 | End: 2021-01-01

## 2021-01-01 RX ORDER — LORAZEPAM 2 MG/ML
0.5 INJECTION INTRAMUSCULAR ONCE
Status: COMPLETED | OUTPATIENT
Start: 2021-01-01 | End: 2021-01-01

## 2021-01-01 RX ORDER — BISACODYL 10 MG
10 SUPPOSITORY, RECTAL RECTAL DAILY PRN
Status: DISCONTINUED | OUTPATIENT
Start: 2021-01-01 | End: 2021-01-01 | Stop reason: HOSPADM

## 2021-01-01 RX ORDER — LORAZEPAM 2 MG/ML
0.5 INJECTION INTRAMUSCULAR EVERY 4 HOURS
Status: DISCONTINUED | OUTPATIENT
Start: 2021-01-01 | End: 2021-01-01 | Stop reason: HOSPADM

## 2021-01-01 RX ORDER — SODIUM CHLORIDE 0.9 % (FLUSH) 0.9 %
10 SYRINGE (ML) INJECTION AS NEEDED
Status: DISCONTINUED | OUTPATIENT
Start: 2021-01-01 | End: 2021-01-01 | Stop reason: HOSPADM

## 2021-01-01 RX ORDER — AMIODARONE HYDROCHLORIDE 200 MG/1
200 TABLET ORAL ONCE
Status: DISCONTINUED | OUTPATIENT
Start: 2021-01-01 | End: 2021-01-01

## 2021-01-01 RX ADMIN — AMIODARONE HYDROCHLORIDE 200 MG: 200 TABLET ORAL at 12:59

## 2021-01-01 RX ADMIN — ASPIRIN 81 MG: 81 TABLET, CHEWABLE ORAL at 08:56

## 2021-01-01 RX ADMIN — MORPHINE SULFATE 4 MG: 4 INJECTION, SOLUTION INTRAMUSCULAR; INTRAVENOUS at 01:06

## 2021-01-01 RX ADMIN — AMIODARONE HYDROCHLORIDE 0.5 MG/MIN: 1.8 INJECTION, SOLUTION INTRAVENOUS at 11:03

## 2021-01-01 RX ADMIN — CHLORHEXIDINE GLUCONATE 15 ML: 1.2 SOLUTION ORAL at 21:19

## 2021-01-01 RX ADMIN — MORPHINE SULFATE 4 MG: 4 INJECTION, SOLUTION INTRAMUSCULAR; INTRAVENOUS at 21:26

## 2021-01-01 RX ADMIN — LORAZEPAM 0.5 MG: 2 INJECTION INTRAMUSCULAR; INTRAVENOUS at 21:26

## 2021-01-01 RX ADMIN — MORPHINE SULFATE 6 MG: 2 INJECTION, SOLUTION INTRAMUSCULAR; INTRAVENOUS at 21:25

## 2021-01-01 RX ADMIN — GLYCOPYRROLATE 0.4 MG: 0.2 INJECTION INTRAMUSCULAR; INTRAVENOUS at 10:20

## 2021-01-01 RX ADMIN — ASPIRIN 81 MG: 81 TABLET, CHEWABLE ORAL at 08:17

## 2021-01-01 RX ADMIN — FENTANYL CITRATE 50 MCG/HR: 50 INJECTION INTRAVENOUS at 19:40

## 2021-01-01 RX ADMIN — CHLORHEXIDINE GLUCONATE 15 ML: 1.2 SOLUTION ORAL at 08:57

## 2021-01-01 RX ADMIN — SCOPALAMINE 1 PATCH: 1 PATCH, EXTENDED RELEASE TRANSDERMAL at 15:52

## 2021-01-01 RX ADMIN — IOPAMIDOL 75 ML: 755 INJECTION, SOLUTION INTRAVENOUS at 17:28

## 2021-01-01 RX ADMIN — AMIODARONE HYDROCHLORIDE 1 MG/MIN: 1.8 INJECTION, SOLUTION INTRAVENOUS at 23:34

## 2021-01-01 RX ADMIN — METOPROLOL TARTRATE 12.5 MG: 25 TABLET, FILM COATED ORAL at 22:16

## 2021-01-01 RX ADMIN — ATORVASTATIN CALCIUM 80 MG: 40 TABLET, FILM COATED ORAL at 20:50

## 2021-01-01 RX ADMIN — SODIUM CHLORIDE, PRESERVATIVE FREE 10 ML: 5 INJECTION INTRAVENOUS at 11:09

## 2021-01-01 RX ADMIN — METOPROLOL TARTRATE 2.5 MG: 5 INJECTION INTRAVENOUS at 00:17

## 2021-01-01 RX ADMIN — AMIODARONE HYDROCHLORIDE 200 MG: 200 TABLET ORAL at 13:00

## 2021-01-01 RX ADMIN — MORPHINE SULFATE 4 MG: 4 INJECTION, SOLUTION INTRAMUSCULAR; INTRAVENOUS at 10:24

## 2021-01-01 RX ADMIN — SODIUM CHLORIDE 50 ML/HR: 9 INJECTION, SOLUTION INTRAVENOUS at 05:40

## 2021-01-01 RX ADMIN — ATORVASTATIN CALCIUM 80 MG: 40 TABLET, FILM COATED ORAL at 21:19

## 2021-01-01 RX ADMIN — METOPROLOL TARTRATE 12.5 MG: 25 TABLET, FILM COATED ORAL at 20:49

## 2021-01-01 RX ADMIN — LORAZEPAM 0.5 MG: 2 INJECTION INTRAMUSCULAR; INTRAVENOUS at 09:51

## 2021-01-01 RX ADMIN — SODIUM CHLORIDE 50 ML/HR: 9 INJECTION, SOLUTION INTRAVENOUS at 08:40

## 2021-01-01 RX ADMIN — LORAZEPAM 1 MG: 2 INJECTION INTRAMUSCULAR; INTRAVENOUS at 19:42

## 2021-01-01 RX ADMIN — SODIUM CHLORIDE, PRESERVATIVE FREE 10 ML: 5 INJECTION INTRAVENOUS at 21:35

## 2021-01-01 RX ADMIN — METOPROLOL TARTRATE 12.5 MG: 25 TABLET, FILM COATED ORAL at 21:19

## 2021-01-01 RX ADMIN — ASPIRIN 81 MG: 81 TABLET, CHEWABLE ORAL at 08:07

## 2021-01-01 RX ADMIN — FAMOTIDINE 20 MG: 10 INJECTION, SOLUTION INTRAVENOUS at 08:56

## 2021-01-01 RX ADMIN — FUROSEMIDE 20 MG: 10 INJECTION INTRAMUSCULAR; INTRAVENOUS at 15:57

## 2021-01-01 RX ADMIN — AMIODARONE HYDROCHLORIDE 200 MG: 200 TABLET ORAL at 05:58

## 2021-01-01 RX ADMIN — FUROSEMIDE 20 MG: 10 INJECTION INTRAMUSCULAR; INTRAVENOUS at 21:26

## 2021-01-01 RX ADMIN — AMIODARONE HYDROCHLORIDE 200 MG: 200 TABLET ORAL at 21:32

## 2021-01-01 RX ADMIN — SODIUM CHLORIDE 50 ML/HR: 9 INJECTION, SOLUTION INTRAVENOUS at 18:49

## 2021-01-01 RX ADMIN — SODIUM CHLORIDE, PRESERVATIVE FREE 10 ML: 5 INJECTION INTRAVENOUS at 22:00

## 2021-01-01 RX ADMIN — MINERAL OIL: 1000 LIQUID ORAL at 09:52

## 2021-01-01 RX ADMIN — METOPROLOL TARTRATE 2.5 MG: 5 INJECTION INTRAVENOUS at 06:31

## 2021-01-01 RX ADMIN — MORPHINE SULFATE 4 MG: 4 INJECTION, SOLUTION INTRAMUSCULAR; INTRAVENOUS at 15:58

## 2021-01-01 RX ADMIN — SODIUM CHLORIDE, PRESERVATIVE FREE 10 ML: 5 INJECTION INTRAVENOUS at 08:07

## 2021-01-01 RX ADMIN — LORAZEPAM 0.5 MG: 2 INJECTION INTRAMUSCULAR; INTRAVENOUS at 16:50

## 2021-01-01 RX ADMIN — LORAZEPAM 0.5 MG: 2 INJECTION INTRAMUSCULAR; INTRAVENOUS at 01:21

## 2021-01-01 RX ADMIN — FUROSEMIDE 20 MG: 10 INJECTION INTRAMUSCULAR; INTRAVENOUS at 21:25

## 2021-01-01 RX ADMIN — FAMOTIDINE 20 MG: 10 INJECTION, SOLUTION INTRAVENOUS at 08:07

## 2021-01-01 RX ADMIN — CHLORHEXIDINE GLUCONATE 15 ML: 1.2 SOLUTION ORAL at 08:07

## 2021-01-01 RX ADMIN — MORPHINE SULFATE 6 MG: 2 INJECTION, SOLUTION INTRAMUSCULAR; INTRAVENOUS at 01:21

## 2021-01-01 RX ADMIN — DEXTROSE MONOHYDRATE 25 ML: 25 INJECTION, SOLUTION INTRAVENOUS at 05:56

## 2021-01-01 RX ADMIN — LEVOTHYROXINE SODIUM 120 MCG: 20 INJECTION, SOLUTION INTRAVENOUS at 12:17

## 2021-01-01 RX ADMIN — LORAZEPAM 1 MG: 2 INJECTION INTRAMUSCULAR; INTRAVENOUS at 12:17

## 2021-01-01 RX ADMIN — LORAZEPAM 1 MG: 2 INJECTION INTRAMUSCULAR; INTRAVENOUS at 08:01

## 2021-01-01 RX ADMIN — AMIODARONE HYDROCHLORIDE 200 MG: 200 TABLET ORAL at 20:52

## 2021-01-01 RX ADMIN — LORAZEPAM 1 MG: 2 INJECTION INTRAMUSCULAR; INTRAVENOUS at 11:30

## 2021-01-01 RX ADMIN — AMIODARONE HYDROCHLORIDE 200 MG: 200 TABLET ORAL at 13:49

## 2021-01-01 RX ADMIN — MORPHINE SULFATE 4 MG: 4 INJECTION, SOLUTION INTRAMUSCULAR; INTRAVENOUS at 11:31

## 2021-01-01 RX ADMIN — GLYCOPYRROLATE 0.4 MG: 0.2 INJECTION INTRAMUSCULAR; INTRAVENOUS at 12:40

## 2021-01-01 RX ADMIN — MINERAL OIL: 1000 LIQUID ORAL at 15:57

## 2021-01-01 RX ADMIN — CHLORHEXIDINE GLUCONATE 15 ML: 1.2 SOLUTION ORAL at 20:49

## 2021-01-01 RX ADMIN — SODIUM CHLORIDE, PRESERVATIVE FREE 10 ML: 5 INJECTION INTRAVENOUS at 21:00

## 2021-01-01 RX ADMIN — FUROSEMIDE 20 MG: 10 INJECTION INTRAMUSCULAR; INTRAVENOUS at 10:24

## 2021-01-01 RX ADMIN — MINERAL OIL: 1000 LIQUID ORAL at 05:08

## 2021-01-01 RX ADMIN — LORAZEPAM 1 MG: 2 INJECTION INTRAMUSCULAR; INTRAVENOUS at 21:12

## 2021-01-01 RX ADMIN — SODIUM CHLORIDE 2.5 MG/HR: 9 INJECTION, SOLUTION INTRAVENOUS at 01:15

## 2021-01-01 RX ADMIN — FUROSEMIDE 20 MG: 10 INJECTION INTRAMUSCULAR; INTRAVENOUS at 03:56

## 2021-01-01 RX ADMIN — LORAZEPAM 0.5 MG: 2 INJECTION INTRAMUSCULAR; INTRAVENOUS at 05:08

## 2021-01-01 RX ADMIN — MORPHINE SULFATE 4 MG: 4 INJECTION, SOLUTION INTRAMUSCULAR; INTRAVENOUS at 12:44

## 2021-01-01 RX ADMIN — ATORVASTATIN CALCIUM 80 MG: 40 TABLET, FILM COATED ORAL at 22:16

## 2021-01-01 RX ADMIN — MORPHINE SULFATE 6 MG: 2 INJECTION, SOLUTION INTRAMUSCULAR; INTRAVENOUS at 09:52

## 2021-01-01 RX ADMIN — SODIUM CHLORIDE, POTASSIUM CHLORIDE, SODIUM LACTATE AND CALCIUM CHLORIDE 500 ML: 600; 310; 30; 20 INJECTION, SOLUTION INTRAVENOUS at 22:53

## 2021-01-01 RX ADMIN — METOPROLOL TARTRATE 5 MG: 5 INJECTION INTRAVENOUS at 11:32

## 2021-01-01 RX ADMIN — KETOROLAC TROMETHAMINE 15 MG: 15 INJECTION, SOLUTION INTRAMUSCULAR; INTRAVENOUS at 08:07

## 2021-01-01 RX ADMIN — LORAZEPAM 1 MG: 2 INJECTION INTRAMUSCULAR; INTRAVENOUS at 10:38

## 2021-01-01 RX ADMIN — MORPHINE SULFATE 4 MG: 4 INJECTION, SOLUTION INTRAMUSCULAR; INTRAVENOUS at 15:53

## 2021-01-01 RX ADMIN — FUROSEMIDE 20 MG: 10 INJECTION INTRAMUSCULAR; INTRAVENOUS at 05:08

## 2021-01-01 RX ADMIN — ASPIRIN 300 MG: 300 SUPPOSITORY RECTAL at 08:53

## 2021-01-01 RX ADMIN — FAMOTIDINE 20 MG: 10 INJECTION, SOLUTION INTRAVENOUS at 20:50

## 2021-01-01 RX ADMIN — SODIUM CHLORIDE, PRESERVATIVE FREE 10 ML: 5 INJECTION INTRAVENOUS at 21:27

## 2021-01-01 RX ADMIN — LORAZEPAM 0.5 MG: 2 INJECTION INTRAMUSCULAR at 16:50

## 2021-01-01 RX ADMIN — MIDAZOLAM 2 MG: 1 INJECTION INTRAMUSCULAR; INTRAVENOUS at 17:45

## 2021-01-01 RX ADMIN — MORPHINE SULFATE 6 MG: 2 INJECTION, SOLUTION INTRAMUSCULAR; INTRAVENOUS at 05:08

## 2021-01-01 RX ADMIN — METOPROLOL TARTRATE 2.5 MG: 5 INJECTION INTRAVENOUS at 18:49

## 2021-01-01 RX ADMIN — SODIUM CHLORIDE 50 ML/HR: 9 INJECTION, SOLUTION INTRAVENOUS at 11:07

## 2021-01-01 RX ADMIN — AMIODARONE HYDROCHLORIDE 200 MG: 200 TABLET ORAL at 22:16

## 2021-01-01 RX ADMIN — METOPROLOL TARTRATE 12.5 MG: 25 TABLET, FILM COATED ORAL at 08:56

## 2021-01-01 RX ADMIN — FUROSEMIDE 20 MG: 10 INJECTION INTRAMUSCULAR; INTRAVENOUS at 09:52

## 2021-01-01 RX ADMIN — MINERAL OIL: 1000 LIQUID ORAL at 03:56

## 2021-01-01 RX ADMIN — AMIODARONE HYDROCHLORIDE 0.5 MG/MIN: 1.8 INJECTION, SOLUTION INTRAVENOUS at 00:41

## 2021-01-01 RX ADMIN — LEVOTHYROXINE SODIUM 120 MCG: 20 INJECTION, SOLUTION INTRAVENOUS at 10:13

## 2021-01-01 RX ADMIN — AMIODARONE HYDROCHLORIDE 0.5 MG/MIN: 1.8 INJECTION, SOLUTION INTRAVENOUS at 10:02

## 2021-01-01 RX ADMIN — FAMOTIDINE 20 MG: 10 INJECTION, SOLUTION INTRAVENOUS at 21:32

## 2021-01-01 RX ADMIN — FAMOTIDINE 20 MG: 10 INJECTION, SOLUTION INTRAVENOUS at 08:17

## 2021-01-01 RX ADMIN — MORPHINE SULFATE 6 MG: 2 INJECTION, SOLUTION INTRAMUSCULAR; INTRAVENOUS at 08:01

## 2021-01-01 RX ADMIN — CHLORHEXIDINE GLUCONATE 15 ML: 1.2 SOLUTION ORAL at 08:17

## 2021-01-01 RX ADMIN — ASPIRIN 300 MG: 300 SUPPOSITORY RECTAL at 18:49

## 2021-01-01 RX ADMIN — METOPROLOL TARTRATE 12.5 MG: 25 TABLET, FILM COATED ORAL at 08:17

## 2021-01-01 RX ADMIN — MORPHINE SULFATE 4 MG: 4 INJECTION, SOLUTION INTRAMUSCULAR; INTRAVENOUS at 10:20

## 2021-01-01 RX ADMIN — LORAZEPAM 0.5 MG: 2 INJECTION INTRAMUSCULAR; INTRAVENOUS at 09:59

## 2021-01-01 RX ADMIN — FAMOTIDINE 20 MG: 10 INJECTION, SOLUTION INTRAVENOUS at 22:17

## 2021-01-01 RX ADMIN — MORPHINE SULFATE 4 MG: 4 INJECTION, SOLUTION INTRAMUSCULAR; INTRAVENOUS at 08:35

## 2021-01-01 RX ADMIN — ETOMIDATE 30 MG: 2 INJECTION, SOLUTION INTRAVENOUS at 17:45

## 2021-01-01 RX ADMIN — AMIODARONE HYDROCHLORIDE 200 MG: 200 TABLET ORAL at 05:48

## 2021-01-01 RX ADMIN — SODIUM CHLORIDE 5 MG/HR: 9 INJECTION, SOLUTION INTRAVENOUS at 12:26

## 2021-01-01 RX ADMIN — LORAZEPAM 1 MG: 2 INJECTION INTRAMUSCULAR; INTRAVENOUS at 04:08

## 2021-01-01 RX ADMIN — MINERAL OIL: 1000 LIQUID ORAL at 10:00

## 2021-01-01 RX ADMIN — MINERAL OIL: 1000 LIQUID ORAL at 21:26

## 2021-01-01 RX ADMIN — LORAZEPAM 0.5 MG: 2 INJECTION INTRAMUSCULAR; INTRAVENOUS at 17:50

## 2021-01-01 RX ADMIN — CHLORHEXIDINE GLUCONATE 15 ML: 1.2 SOLUTION ORAL at 22:16

## 2021-01-01 RX ADMIN — LEVOTHYROXINE SODIUM 120 MCG: 20 INJECTION, SOLUTION INTRAVENOUS at 13:00

## 2021-01-01 RX ADMIN — LORAZEPAM 0.5 MG: 2 INJECTION INTRAMUSCULAR; INTRAVENOUS at 21:25

## 2021-01-01 RX ADMIN — MORPHINE SULFATE 4 MG: 4 INJECTION, SOLUTION INTRAMUSCULAR; INTRAVENOUS at 03:56

## 2021-01-01 RX ADMIN — SODIUM CHLORIDE, PRESERVATIVE FREE 10 ML: 5 INJECTION INTRAVENOUS at 20:15

## 2021-01-01 RX ADMIN — FUROSEMIDE 20 MG: 10 INJECTION INTRAMUSCULAR; INTRAVENOUS at 15:52

## 2021-01-01 RX ADMIN — SODIUM CHLORIDE, PRESERVATIVE FREE 10 ML: 5 INJECTION INTRAVENOUS at 08:53

## 2021-01-01 RX ADMIN — MINERAL OIL: 1000 LIQUID ORAL at 21:25

## 2021-10-26 PROBLEM — E78.49 OTHER HYPERLIPIDEMIA: Chronic | Status: ACTIVE | Noted: 2021-01-01

## 2021-10-26 PROBLEM — M81.0 AGE RELATED OSTEOPOROSIS: Status: ACTIVE | Noted: 2017-02-06

## 2021-10-26 PROBLEM — I10 ESSENTIAL HYPERTENSION: Chronic | Status: ACTIVE | Noted: 2021-01-01

## 2021-10-26 PROBLEM — Z72.0 TOBACCO ABUSE: Chronic | Status: ACTIVE | Noted: 2021-01-01

## 2021-10-26 PROBLEM — I63.9 ACUTE CVA (CEREBROVASCULAR ACCIDENT): Status: ACTIVE | Noted: 2021-01-01

## 2021-10-26 PROBLEM — I63.512 ACUTE ISCHEMIC LEFT MCA STROKE: Status: ACTIVE | Noted: 2021-01-01

## 2021-10-26 PROBLEM — J30.9 ALLERGIC RHINITIS: Status: ACTIVE | Noted: 2017-02-06

## 2021-10-26 PROBLEM — I48.19 PERSISTENT ATRIAL FIBRILLATION: Status: ACTIVE | Noted: 2021-01-01

## 2021-10-26 PROBLEM — J44.9 CHRONIC OBSTRUCTIVE LUNG DISEASE: Status: ACTIVE | Noted: 2017-02-06

## 2021-10-26 PROBLEM — E78.49 OTHER HYPERLIPIDEMIA: Status: ACTIVE | Noted: 2021-01-01

## 2021-10-26 PROBLEM — G89.4 CHRONIC PAIN DISORDER: Status: ACTIVE | Noted: 2020-10-20

## 2021-10-26 PROBLEM — I25.10 CORONARY ARTERIOSCLEROSIS: Status: ACTIVE | Noted: 2021-01-01

## 2021-10-26 NOTE — PROGRESS NOTES
"Ms. Niño is an 81-year-old female with reported new onset of atrial fibrillation.  She presented to [facility] emergency department within 90 minutes of witnessed onset of left MCA syndrome.  She was administered IV TPA, per protocol.  CTA/CT perfusion demonstrate an acute occlusion of the intracranial left ICA with thrombus extension into the MCA (carotid \"T\" lesion).  Her noncontrast CT demonstrates a reasonable ASPECT score of 8, and thus she is being transferred to [facility] for higher level of care and emergent neuro intervention/mechanical thrombectomy.  While her CT perfusion scan (see below) is concerning for a larger area of \"core\" infarct, again her ASPECTS is favorable and given that she presents within 6 hours of last known well, she is deemed a candidate for emergent neuro intervention/thrombectomy.      "

## 2021-10-29 PROBLEM — J96.01 ACUTE RESPIRATORY FAILURE WITH HYPOXIA AND HYPERCAPNIA (HCC): Status: ACTIVE | Noted: 2021-01-01

## 2021-10-29 PROBLEM — J96.02 ACUTE RESPIRATORY FAILURE WITH HYPOXIA AND HYPERCAPNIA (HCC): Status: ACTIVE | Noted: 2021-01-01

## 2021-10-29 PROBLEM — I63.511 ACUTE ISCHEMIC RIGHT MCA STROKE: Status: ACTIVE | Noted: 2021-01-01

## 2021-11-01 PROBLEM — I63.9 CVA (CEREBRAL VASCULAR ACCIDENT): Status: ACTIVE | Noted: 2021-01-01

## 2021-11-01 NOTE — PROGRESS NOTES
Continued Stay Note   [place]     Patient Name: Awilda Niño  MRN: [id number]  Today's Date: 11/1/2021    Admit Date: 11/1/2021     Discharge Plan     Row Name 11/01/21 1804       Plan    Plan Inpatient hospice    Plan Comments Inpatient hospice team met with spouse and patient’s 2 sons at bedside and discussed inpatient hospice services.  They are electing this benefit and want patient kept comfortable during dying process.  Discharge/readmit order received from Dr. Ron.  Patient will receive general inpatient care for skilled nursing assessment for nonverbal signs of pain/discomfort, interventions for symptom management of pain and anxiety with injectable medication administration, and monitoring for effectiveness and necessary adjustments to these medications.               Discharge Codes    No documentation.                     Kezia Bello RN

## 2021-11-01 NOTE — PROGRESS NOTES
Neurology       Patient Care Team:  Bonifacio Brooks MD as PCP - General (Family Medicine)    Chief complaint: Multiple strokes    History: Patient is minimally responsive.    She seems to be comfortable getting occasional dose of morphine.      Past Medical History:   Diagnosis Date   • Atrial fibrillation (HCC)    • COPD (chronic obstructive pulmonary disease) (HCC)    • Disease of thyroid gland    • GERD (gastroesophageal reflux disease)    • Hyperlipidemia    • Hypertension    • Insomnia    • Stroke (HCC)        Vital Signs   Vitals:    11/01/21 0109 11/01/21 0110 11/01/21 0111 11/01/21 0112   BP:       BP Location:       Patient Position:       Pulse: 81 81 78 78   Resp:       Temp:       TempSrc:       SpO2: 91% 90% 91% 91%   Weight:       Height:           Physical Exam:   General: Stuporous              Neuro: Arouses to stimulation but does not interact and does not follow.    Speech is absent.    Cranial nerves show positive corneals bilaterally.  The eyes are conjugate pupils are equal.    Right arm is hypertonic.  Left arm is flaccid.    Both legs are hypotonic.    He withdraws slightly with the right side.    Left side shows no movement of any kind.        Results Review:  No new results  Results from last 7 days   Lab Units 10/29/21  0402   WBC 10*3/mm3 19.24*   HEMOGLOBIN g/dL 11.3*   HEMATOCRIT % 33.2*   PLATELETS 10*3/mm3 103*     Results from last 7 days   Lab Units 10/29/21  0401 10/28/21  0733 10/27/21  0703   SODIUM mmol/L 141 141 140   POTASSIUM mmol/L 4.0 4.1 4.0   CHLORIDE mmol/L 112* 110* 109*   CO2 mmol/L 17.0* 19.0* 18.0*   BUN mg/dL 20 17 18   CREATININE mg/dL 0.64 0.68 0.76   CALCIUM mg/dL 8.2* 8.5* 8.6   BILIRUBIN mg/dL 0.6  --   --    ALK PHOS U/L 67  --   --    ALT (SGPT) U/L 14  --   --    AST (SGOT) U/L 26  --   --    GLUCOSE mg/dL 116* 124* 118*       Imaging Results (Last 24 Hours)     Procedure Component Value Units Date/Time    XR Chest 1 View [[id number]] Collected:  10/29/21 0916     Updated: 11/01/21 0938    Narrative:      EXAMINATION: XR CHEST 1 VW-10/29/2021:      INDICATION: Respiratory failure; R47.01-Aphasia; R13.10-Dysphagia,  unspecified.      COMPARISON: 10/28/2021.     FINDINGS: Portable chest reveals the heart to be enlarged. The patient  is status post valvular replacement. Lines and tubes in satisfactory  position. Small right pleural effusion with mild increased markings  identified at the right lung base. Degenerative changes seen within the  spine.       Impression:      Lines and tubes in satisfactory position. Small right  pleural effusion with mild increased markings identified in the lung  bases bilaterally.     D:  10/29/2021  E:  10/29/2021     This report was finalized on 11/1/2021 9:35 AM by Dr. Herminia Doyle MD.             Assessment:  Bilateral embolic strokes    Plan:  Patient going to the [facility] care center.        Comment:  Spoke to the patient's family.    Emphasized the fact that besides having bilateral motor deficits that soon severe strokes of this kind typically involve severe cognitive impairment on top of that which limits her recuperative harris.         I discussed the patients findings and my recommendations with family    J Carlos Salazar MD  11/01/21  12:54 EDT

## 2021-11-01 NOTE — INTERVAL H&P NOTE
Pt admitted to [facility] on 11/1/2021. Please see Hospice documentation for further information.

## 2021-11-01 NOTE — PROGRESS NOTES
Intensive Care Follow-up     Hospital:  LOS: 6 days   Ms. Awilda Niño, 81 y.o. female is followed for:   Acute ischemic left MCA stroke (HCC)            History of present illness:   81-year-old female active smoker transferred from [facility] the evening of 10/26/2021. Patient has PMH significant for hypertension, hyperlipidemia, and has previously undergone percutaneous mitral valve replacement in the past at [facility].       The patient presented to her primary care physician 10/26/2021 and was diagnosed with new onset atrial fibrillation with RVR.  She was sent to [facility] for further evaluation and upon arrival was noted to have acute neurologic changes with left gaze preference, right hemiplegia, global aphasia. NIH stroke scale was measured at 30.  She underwent a CT of the head without contrast which was negative for hemorrhage and subsequently TPA was initiated at 1641.  She subsequently underwent a CTA of head and neck and a CTP scan which revealed acute occlusion of intracranial left ICA with thrombus extending into the MCA. There was a small amount of core infarct with significant penumbra.  She was emergently transferred to [facility] and immediately taken to the catheterization lab where she underwent mechanical thrombectomy  removing a massive thrombus  with restoration of normal (TICI 3) flow to the left cerebral hemisphere. Mechanism of stroke was felt to be cardioembolic related to her PAF.  On 10/27/2021 patient developed A. fib with RVR and a rate of 131 (transiently up to 160).  Was begun on amiodarone but had persistent tachycardia until given IV metoprolol.  Subsequently converted to sinus.  On 10/8/2021 patient was in a sinus rhythm on amiodarone drip which was converted to oral.  She passed a speech evaluation and was felt she could tolerate puréed foods.  She remained aphasic, but could follow commands with cueing and had definite improvement in right-sided weakness.   Follow-up CT scan just revealed an evolving left MCA infarct with some edema but no hemorrhage. Only therapy received had been aspirin because it was only 3 days since her stroke and plans were to start anticoagulation at 7 days unfortunately 10/28/2021 developed abrupt onset of decreased level of awareness and new left facial weakness and left hemiparesis (contralateral side of previous event).  She required intubation because of inadequate respirations and underwent repeat angiography and thrombectomy of an acute occlusion of the right MCA and right VAMSI.  Despite successful extraction, the patient did not improve, remained obtunded, and was left on ventilatory support.  Discussions with family indicated that they would wish to withdraw from support, palliative care was consulted, and plans are for extubation when all family has had a chance to see Ms. cardoso      Subjective   Interval History:  Patient palliative extubated yesterday.  She is currently comfortable with family at bedside.  They had no complaints or issues.             The patient's past medical, surgical and social history were reviewed and updated in Epic as appropriate.       Objective     Infusions:     Medications:  Levothyroxine Sodium, 120 mcg, Intravenous, Daily  sodium chloride, 10 mL, Intravenous, Q12H      I reviewed the patient's medications.    Vital Sign Min/Max for last 24 hours  No data recorded   No data recorded   Pulse  Min: 76  Max: 81   No data recorded   SpO2  Min: 90 %  Max: 99 %   No data recorded       Input/Output for last 24 hour shift  10/31 0701 - 11/01 0700  In: 349 [I.V.:319]  Out: 2375 [Urine:2375]      GENERAL : Elderly frail female lying in bed no acute distress  RESPIRATORY/THORAX : normal respiratory effort and no intercostal retractions, managed breath sounds bilaterally  CARDIOVASCULAR : Normal S1/S2, RRR.  No lower ext edema.  GASTROINTESTINAL : Soft, NT/ND. BS x 4 normoactive. No  hepatosplenomegaly.  MUSCULOSKELETAL : No cyanosis, clubbing, or ischemia  NEUROLOGICAL: Unresponsive    Results from last 7 days   Lab Units 10/29/21  0402 10/28/21  0732 10/27/21  0703   WBC 10*3/mm3 19.24* 18.50* 19.61*   HEMOGLOBIN g/dL 11.3* 12.7 13.3   PLATELETS 10*3/mm3 103* 165 177     Results from last 7 days   Lab Units 10/29/21  0401 10/28/21  0733 10/27/21  0703   SODIUM mmol/L 141 141 140   POTASSIUM mmol/L 4.0 4.1 4.0   CO2 mmol/L 17.0* 19.0* 18.0*   BUN mg/dL 20 17 18   CREATININE mg/dL 0.64 0.68 0.76   MAGNESIUM mg/dL 2.0 2.0 2.3   PHOSPHORUS mg/dL 3.2 3.2 3.8   GLUCOSE mg/dL 116* 124* 118*     Estimated Creatinine Clearance: 56.6 mL/min (by C-G formula based on SCr of 0.64 mg/dL).    Results from last 7 days   Lab Units 10/29/21  0357   PH, ARTERIAL pH units 7.432   PCO2, ARTERIAL mm Hg 30.7*   PO2 ART mm Hg 354.0*       I reviewed the patient's new clinical results.  I reviewed the patient's new imaging results/reports including actual images and agree with reports.       Imaging Results (Last 24 Hours)     Procedure Component Value Units Date/Time    XR Chest 1 View [[id number]] Collected: 10/29/21 0916     Updated: 11/01/21 0938    Narrative:      EXAMINATION: XR CHEST 1 VW-10/29/2021:      INDICATION: Respiratory failure; R47.01-Aphasia; R13.10-Dysphagia,  unspecified.      COMPARISON: 10/28/2021.     FINDINGS: Portable chest reveals the heart to be enlarged. The patient  is status post valvular replacement. Lines and tubes in satisfactory  position. Small right pleural effusion with mild increased markings  identified at the right lung base. Degenerative changes seen within the  spine.       Impression:      Lines and tubes in satisfactory position. Small right  pleural effusion with mild increased markings identified in the lung  bases bilaterally.     D:  10/29/2021  E:  10/29/2021     This report was finalized on 11/1/2021 9:35 AM by Dr. Herminia Doyle MD.             Assessment/Plan    Impression        Acute ischemic cardioembolic left MCA stroke s/p IV tPA and mechanical thrombectomy 10/26/2021 ([facility])    New onset atrial fibrillation 10/26/2021 ([facility])    Essential hypertension    Hyperlipidemia    Tobacco abuse    Acute cardioembolic R MCA stroke s/p mechanical thrombectomy 10/28/2021 ([facility])    Acute hypoxemic/hypercarbic respiratory failure due to neurologic event requiring intubation 10/28/2021 ([facility])       Plan        81-year-old female with an acute left MCA stroke status post TPA and mechanical thrombectomy on 10/26/2021 and then a acute right MCA cardioembolic stroke status post mechanical thrombectomy on 10/28/2021.  Who had suffered significant brain injury and was palliatively extubated on 11/1/2021.    · Palliative care consulted appreciate recommendation  · Continue as needed morphine, Ativan, and Robinul  · Patient will likely move to hospice later today  · Pulmonary available for any assistance needed    Plan of care and goals reviewed with multidisciplinary/antibiotic stewardship team during rounds.   I discussed the patient's findings and my recommendations with family and nursing staff       Deann Ron DO  Pulmonary, Critical care and Sleep Medicine

## 2021-11-01 NOTE — PROGRESS NOTES
Palliative Care Progress Note    Date of Admission: 10/26/2021    Subjective: Patient remains unresponsive.  Family reports that she is appeared mostly comfortable.  Current Code Status     Date Active Code Status Order ID Comments User Context       10/30/2021 1018 No CPR [id number]  Timothy Brooke MD Inpatient     Advance Care Planning Activity      Questions for Current Code Status     Question Answer    Code Status No CPR    Medical Interventions (Level of Support Prior to Arrest) Limited    Limited Support to NOT Include Antiarrhythmic Drugs     Cardioversion/Defibrillation     Intubation     NIPPV (Non-Invasive Positive Pressure Support)     Vasopressors     Blood Products     Artificial Nutrition     Antibiotics    Level Of Support Discussed With Next of Kin (If No Surrogate)        No current facility-administered medications on file prior to encounter.     Current Outpatient Medications on File Prior to Encounter   Medication Sig Dispense Refill   • amLODIPine (NORVASC) 5 MG tablet Take 5 mg by mouth Daily.     • atorvastatin (LIPITOR) 40 MG tablet Take 40 mg by mouth Every Night.     • fluticasone (VERAMYST) 27.5 MCG/SPRAY nasal spray 1 spray into the nostril(s) as directed by provider Daily.     • HYDROcodone-acetaminophen (NORCO) 5-325 MG per tablet Take 1 tablet by mouth 2 (Two) Times a Day.     • levothyroxine (SYNTHROID, LEVOTHROID) 150 MCG tablet Take 150 mcg by mouth Daily.     • lisinopril (PRINIVIL,ZESTRIL) 20 MG tablet Take 20 mg by mouth Daily.     • omeprazole (priLOSEC) 40 MG capsule Take 40 mg by mouth Daily.     • sulfamethoxazole-trimethoprim (BACTRIM DS,SEPTRA DS) 800-160 MG per tablet Take 1 tablet by mouth Daily. Takes daily for folliculitis     • tiZANidine (ZANAFLEX) 4 MG tablet Take 4 mg by mouth 2 (Two) Times a Day.     • zolpidem (AMBIEN) 10 MG tablet Take 10 mg by mouth At Night As Needed for Sleep.          •  dextrose  •  glycopyrrolate  •  LORazepam  •  Morphine  •  sodium  "chloride    Objective: /61 (BP Location: Left arm, Patient Position: Lying)   Pulse 78   Temp 98.2 °F (36.8 °C) (Axillary)   Resp 16   Ht 167.6 cm (66\")   Wt 65 kg (143 lb 4.8 oz)   SpO2 91%   BMI 23.13 kg/m²      Intake/Output Summary (Last 24 hours) at 11/1/2021 1344  Last data filed at 11/1/2021 0600  Gross per 24 hour   Intake --   Output 2225 ml   Net -2225 ml     Physical Exam:      General Appearance:   Unresponsive   Head:    Normocephalic, without obvious abnormality, atraumatic   Eyes:            Lids and lashes normal, conjunctivae and sclerae normal, no   icterus, no pallor, corneas clear, PERRLA   Ears:    Ears appear intact with no abnormalities noted   Throat:   No oral lesions, no thrush, oral mucosa moist   Neck:   No adenopathy, supple, trachea midline, no thyromegaly, no     carotid bruit, no JVD   Back:     No kyphosis present, no scoliosis present, no skin lesions,       erythema or scars, no tenderness to percussion or                   palpation,   range of motion normal   Lungs:     Clear to auscultation,respirations regular, even and                   unlabored    Heart:    Regular rhythm and normal rate, normal S1 and S2, no            murmur, no gallop, no rub, no click   Breast Exam:    Deferred   Abdomen:     Normal bowel sounds, no masses, no organomegaly, soft        non-tender, non-distended, no guarding, no rebound                 tenderness   Genitalia:    Deferred   Extremities:  Positive edema bilateral lower extremities   Pulses:   Pulses palpable and equal bilaterally   Skin:   No bleeding, bruising or rash   Lymph nodes:   No palpable adenopathy         Results from last 7 days   Lab Units 10/29/21  0402   WBC 10*3/mm3 19.24*   HEMOGLOBIN g/dL 11.3*   HEMATOCRIT % 33.2*   PLATELETS 10*3/mm3 103*     Results from last 7 days   Lab Units 10/29/21  0401   SODIUM mmol/L 141   POTASSIUM mmol/L 4.0   CHLORIDE mmol/L 112*   CO2 mmol/L 17.0*   BUN mg/dL 20   CREATININE " mg/dL 0.64   CALCIUM mg/dL 8.2*   BILIRUBIN mg/dL 0.6   ALK PHOS U/L 67   ALT (SGPT) U/L 14   AST (SGOT) U/L 26   GLUCOSE mg/dL 116*       Impression: CVA  Resp failure  Change in MS  Plan: Family is interested in inpatient hospice, will consult.  Otherwise continue rest of the symptom management regimen.        Toñito Edwards,   11/01/21  13:44 EDT

## 2021-11-01 NOTE — H&P
Hospice History and Physical     Patient Name:  Awilda Niño   : 1940   Sex: female    Patient Care Team:  Bonifacio Brooks MD as PCP - General (Family Medicine)    Code Status: Comfort Measures    Subjective     Per Intensivist Discharge Summary:    History of Present Illness:  81-year-old female transferred from [facility] this evening.  She has a past medical history significant for hypertension, dyslipidemia, degenerative joint disease, percutaneous mitral valve repair/replacement in [place] in the past, and ongoing smoking.     The patient presented to her primary care physician earlier today and was diagnosed with new onset atrial fibrillation with RVR.  She was sent to [facility] for further evaluation and wall and root had acute neurologic changes.  On arrival to their facility she was noted to have left gaze preference, right hemiparesis, and global aphasia.  NIH stroke scale was measured at 30.  She underwent a CT of the head without contrast which was negative for hemorrhage.  TPA was initiated at 1641.  She underwent a CT of the head and neck and a CT perfusion scan which revealed acute occlusion of her left ICA extending into the MCA and a small amount of core infarct with significant penumbra.  She was emergently transferred to our facility and went immediately to the catheterization lab.  She underwent mechanical thrombectomy with TICI 3 flow.  Mechanism of her stroke was felt to be cardioembolic secondary to atrial fibrillation.      Hospital Course:  The following day, she developed A fib with RVR and was started on IV amiodarone and IV metoprolol with eventual conversion to NSR.  Amio was converted from IV to PO.  Even though she remained aphasic, she passed a dysphagia evaluation, followed commands with right sided weakness improvement.  Repeat CTH revealed evolving left MCA infarct with some edema, but no hemorrhage.  She was on ASA with plans to start  anticoagulation 7 days post stroke.  However, on 10/28, she had sudden onset of AMS, left sided weakness and left hemiparesis which was contralateral to symptoms on admission.  She was intubated for airway protection.  She had a repeat angiogram and underwent thrombectomy from right MCA and right VAMSI.  Despite intervention, she did not improve and remained obtunded on mechanical ventilation.  After discussion with the family, palliative care was consulted.  Family wished to pursue palliative extubation and comfort measures.  Hospice was consulted.  They have accepted her and will assume her care.    [end of copied text]    Mrs. Niño was admitted to in Hospice on 11/1/2021 for mgmt of acute symptoms 2/2 CVA.      of almost 64 years and son present at bedside (other son is in facility but off the floor during visit). Pt is comfortable; unresponsive.     Review of Systems  Review of Systems   Unable to perform ROS: Acuity of condition       History  Past Medical History:   Diagnosis Date   • Atrial fibrillation (HCC)    • COPD (chronic obstructive pulmonary disease) (HCC)    • Disease of thyroid gland    • GERD (gastroesophageal reflux disease)    • Hyperlipidemia    • Hypertension    • Insomnia    • Stroke (HCC)      Past Surgical History:   Procedure Laterality Date   • INTERVENTIONAL RADIOLOGY PROCEDURE N/A 10/26/2021    Procedure: IR MECHANICAL THROMBECTOMY - PRIMARY;  Surgeon: Fritz Dye MD;  Location:  [facility] CATH INVASIVE LOCATION;  Service: Interventional Radiology;  Laterality: N/A;   • INTERVENTIONAL RADIOLOGY PROCEDURE Bilateral 10/28/2021    Procedure: CAROTID CEREBRAL ANGIOGRAM BILATERAL;  Surgeon: Fritz Dye MD;  Location:  [facility] CATH INVASIVE LOCATION;  Service: Interventional Radiology;  Laterality: Bilateral;   • MITRAL VALVE REPLACEMENT       Current Facility-Administered Medications   Medication Dose Route Frequency Provider Last Rate Last Admin   • [START ON 11/2/2021]  acetaminophen (TYLENOL) suppository 650 mg  650 mg Rectal Q4H PRN Tatiana Bello H, APRN       • bisacodyl (DULCOLAX) suppository 10 mg  10 mg Rectal Daily PRN Tatiana Bello, APRN       • furosemide (LASIX) injection 20 mg  20 mg Intravenous Q6H PRN Tatiana Bello, APRN       • furosemide (LASIX) injection 20 mg  20 mg Intravenous Q6H Tatiana Bello, APRN   20 mg at 11/01/21 1552   • glycopyrrolate (ROBINUL) injection 0.2 mg  0.2 mg Intravenous Q6H PRN Tatiana Bello, APRN       • haloperidol lactate (HALDOL) injection 1 mg  1 mg Intravenous Q4H PRN Tatiana Bello, APRN       • ketorolac (TORADOL) injection 15 mg  15 mg Intravenous Q6H PRN Tatiana Bello, APRN       • LORazepam (ATIVAN) injection 0.5 mg  0.5 mg Intravenous Q4H PRN Tatiana Bello, APRN       • LORazepam (ATIVAN) injection 0.5 mg  0.5 mg Intravenous BID Tatiana Bello, APRN       • LORazepam (ATIVAN) injection 1 mg  1 mg Intravenous Q4H PRN Tatiana Bello, APRN       • LORazepam (ATIVAN) injection 2 mg  2 mg Intravenous Q15 Min PRN Tatiana Bello, APRN       • Morphine sulfate (PF) injection 4 mg  4 mg Intravenous Q1H PRN Tatiana Bello, APRN       • Morphine sulfate (PF) injection 4 mg  4 mg Intravenous Q6H Tatiana Belol, APRN   4 mg at 11/01/21 1553   • ondansetron (ZOFRAN) injection 4 mg  4 mg Intravenous Q6H PRN Tatiana Bello, APRN       • palliative care oral rinse   Mouth/Throat PRN Tatiana Bello, APRN       • palliative care oral rinse   Mouth/Throat Q6H Tatiana Bello, APRN       • polyvinyl alcohol (LIQUIFILM) 1.4 % ophthalmic solution 2 drop  2 drop Both Eyes Q1H PRN Tatiana Bello, APRN       • scopolamine patch 1 mg/72 hr  1 patch Transdermal Q72H Tatiana Bello APRN   1 patch at 11/01/21 1552   • scopolamine patch 1 mg/72 hr  1 patch Transdermal Q72H PRN Tatiana Bello APRN            •  [START ON 11/2/2021] acetaminophen  •  bisacodyl  •  furosemide  •   glycopyrrolate  •  haloperidol lactate  •  ketorolac  •  LORazepam  •  LORazepam  •  LORazepam  •  Morphine  •  ondansetron  •  palliative care oral rinse  •  polyvinyl alcohol  •  Scopolamine  Allergies   Allergen Reactions   • Amitriptyline Unknown - Low Severity   • Cefaclor Unknown - Low Severity   • Hctz [Hydrochlorothiazide] Unknown - Low Severity   • Tylenol [Acetaminophen] Unknown - Low Severity     No family history on file.  Social History     Socioeconomic History   • Marital status:    Tobacco Use   • Smoking status: Current Every Day Smoker     Packs/day: 0.50   • Smokeless tobacco: Never Used       Objective     Vital Signs  Heart Rate:  [76-81] 78    PPS: Palliative Performance Scale score as of 11/2/2021, 08:38 EDT is 10% based on the following measures:   Ambulation: Totally bed bound  Activity and Evidence of Disease: Unable to do any work, extensive evidence of disease  Self-Care: Total care  Intake:  Mouth care only  LOC: Drowsy or coma      Physical Exam:  Physical Exam  Constitutional:       General: She is not in acute distress.     Appearance: She is not toxic-appearing.      Comments: Pt comfortable; lying supine in bed   HENT:      Head: Normocephalic.      Mouth/Throat:      Mouth: Mucous membranes are dry.   Eyes:      Comments: Closed - did open briefly - appeared to have right lateral gaze but eyes closed quickly and stayed closed   Cardiovascular:      Rate and Rhythm: Normal rate and regular rhythm.      Heart sounds: Murmur (III/VI CHRISTIE) heard.       Pulmonary:      Comments: CTA; respirations are nonlabored; no audible congestion; mouth open breathing, relaxed  Abdominal:      General: Bowel sounds are normal.      Palpations: Abdomen is soft.   Musculoskeletal:      Cervical back: Neck supple.      Right lower leg: No edema.      Left lower leg: No edema.   Skin:     General: Skin is dry.      Coloration: Skin is pale.   Neurological:      Comments: Does not follow commands    Psychiatric:      Comments: No facial grimacing; no moaning         Results Review:   Lab Results   Component Value Date    HGBA1C 5.20 10/27/2021       Lab Results   Component Value Date    GLUCOSE 116 (H) 10/29/2021    BUN 20 10/29/2021    CREATININE 0.64 10/29/2021    EGFRIFNONA 89 10/29/2021    BCR 31.3 (H) 10/29/2021    K 4.0 10/29/2021    CO2 17.0 (L) 10/29/2021    CALCIUM 8.2 (L) 10/29/2021    ALBUMIN 3.00 (L) 10/29/2021    AST 26 10/29/2021    ALT 14 10/29/2021         CVA (cerebral vascular accident) ([facility])      Assessment/Plan   Assessment/Plan:     81yoF admitted in Hospice on 11/1 for CVA.     AMS/unconsciousness  Congestion  Anxiety  Pain, nos, existential  Dyspnea  Constipation  EOLC    Transfer from ICU to N5B please    Hospice admitting order set utilized    Lasix 20mg q6h    Ativan 0.5mg BID    Morphine 4mg q6h - pt has required multiple doses in past 12hours    Scheduled and prn palliative oral rinse    Scop patch    Continue indwelling marte    Prns for comfort    Discussion at bedside with loving  (Dangelo) and son (Ed) regarding end of life s/s and symptom mgmt. Other son, Garth, has stepped off floor for a few minutes; he has been present throughout the today and for admission. Support provided. Hospice contact information shared.     Coordinated care with Nursing    Disposition: EOLC    Total Visit Time: > 75min  Face to Face Time: 30min    Justification for care:  Patient meets criteria for acute in-patient care with required nursing assessment and interventions for symptoms with IV medications.      Tatiana Bello, LONNY, MHA, APRN  [facility] Navigators  Hospice and Palliative Care Nurse Practitioner  11/01/21  16:54 EDT

## 2021-11-01 NOTE — PROGRESS NOTES
Continued Stay Note  [facility]     Patient Name: Awilda Niño  MRN: [id number]  Today's Date: 11/1/2021    Admit Date: 10/26/2021     Discharge Plan     Row Name 11/01/21 1145       Plan    Plan Meeting at 1400    Plan Comments Inpatient hospice team to meet with family at 1400.    If inpatient hospice team can be of assistance, please call [phone].                                          Discharge Codes    No documentation.               Expected Discharge Date and Time     Expected Discharge Date Expected Discharge Time    Nov 3, 2021             ZELDA Borrero

## 2021-11-01 NOTE — DISCHARGE PLACEMENT REQUEST
"Awilda Niño (81 y.o. Female)             Date of Birth Social Security Number Address Home Phone MRN    1940  [id number]   [place] [address] [phone] [id number]    [employer] Marital Status             Unknown        Admission Date Admission Type Admitting Provider Attending Provider Department, Room/Bed    10/26/21 Urgent Naveen Toney MD Harrison, John M, MD [facility] 2B ICU, N240/1    Discharge Date Discharge Disposition Discharge Destination                         Attending Provider: Timothy Brooke MD    Allergies: Amitriptyline, Cefaclor, Hctz [Hydrochlorothiazide], Tylenol [Acetaminophen]    Isolation: None   Infection: None   Code Status: No CPR   Advance Care Planning Activity    Ht: 167.6 cm (66\")   Wt: 65 kg (143 lb 4.8 oz)    Admission Cmt: None   Principal Problem: Acute ischemic cardioembolic left MCA stroke s/p IV tPA and mechanical thrombectomy 10/26/2021 ([facility]) [I63.512]                 Active Insurance as of 10/26/2021     Primary Coverage     Payor Plan Insurance Group Employer/Plan Group    MEDICARE MEDICARE A & B      Payor Plan Address Payor Plan Phone Number Payor Plan Fax Number Effective Dates    [address] [phone]  7/1/2005 - None Entered    [place] [address]       Subscriber Name Subscriber Birth Date Member ID       AWILDA NIÑO 1940 [id number]                 Emergency Contacts      (Rel.) Home Phone Work Phone Mobile Phone    jesse niño (Spouse) [phone] -- [phone]    YayaGarth reis (Son) -- -- [phone]            Emergency Contact Information     Name Relation Home Work Mobile    jesse niño Spouse [phone]    Garth Niño Son   [phone]          Insurance Information                MEDICARE/MEDICARE A & B Phone: [phone]    Subscriber: Violet Niñovia Subscriber#: [id number]    Group#: -- Precert#: --             History & Physical      Dawna" Naveen HUNTLEY MD at 10/26/21 1932                Chief complaint: Acute mental status changes and A. fib of new onset    Subjective     81-year-old female transferred from [facility] this evening.  She has a past medical history significant for hypertension, dyslipidemia, degenerative joint disease, percutaneous mitral valve repair/replacement in [place] in the past, and ongoing smoking.    The patient presented to her primary care physician earlier today and was diagnosed with new onset atrial fibrillation with RVR.  She was sent to [facility] for further evaluation and wall and root had acute neurologic changes.  On arrival to their facility she was noted to have left gaze preference, right hemiparesis, and global aphasia.  NIH stroke scale was measured at 30.  She underwent a CT of the head without contrast which was negative for hemorrhage.  TPA was initiated at 1641.  She underwent a CT of the head and neck and a CT perfusion scan which revealed acute occlusion of her left ICA extending into the MCA and a small amount of core infarct with significant penumbra.  She was emergently transferred to our facility and went immediately to the catheterization lab.  She underwent mechanical thrombectomy with TICI 3 flow.  Mechanism of her stroke was felt to be cardioembolic.      History  Past Medical History:   Diagnosis Date   • Atrial fibrillation (HCC)    • Hyperlipidemia    • Hypertension    • Stroke (HCC)      Past Surgical History:   Procedure Laterality Date   • MITRAL VALVE REPLACEMENT       History reviewed. No pertinent family history.  Social History     Tobacco Use   • Smoking status: Current Every Day Smoker     Packs/day: 0.50   • Smokeless tobacco: Never Used   Substance Use Topics   • Alcohol use: Not on file   • Drug use: Not on file       Review of Systems   Review of systems could not be obtained due to   patient nonverbal.      Objective     Vital Signs  Heart Rate:  [123-154]  "141  Resp:  [15-16] 15  BP: (100-153)/(61-90) 111/82    Physical Exam:    Objective:  General Appearance:  In no acute distress, uncomfortable and ill-appearing.    Vital signs: (most recent): Blood pressure 111/82, pulse (!) 141, resp. rate 15, height 167.6 cm (66\"), weight 63.5 kg (140 lb), SpO2 94 %.    HEENT: Normal HEENT exam.  (Nasal cannula O2)    Lungs:  Normal effort and normal respiratory rate.  Breath sounds clear to auscultation.  She is not in respiratory distress.  No rales, wheezes or rhonchi.    Heart: Tachycardia.  Irregular rhythm.  S1 normal and S2 normal.  No murmur, gallop or friction rub.   Chest: Symmetric chest wall expansion.   Abdomen: Abdomen is soft and non-distended.  Bowel sounds are normal.   There is no abdominal tenderness.   There is no mass. There is no splenomegaly. There is no hepatomegaly.   Extremities: There is no deformity or dependent edema.    Neurological: (Drowsy  Nonverbal  Does not follow commands but moves all 4 extremities spontaneously though seems weaker on the right side).    Pupils:  Pupils are equal, round, and reactive to light.    Skin:  Warm and dry.          Interval: baseline  1a. Level of Consciousness: 0-->Alert, keenly responsive  1b. LOC Questions: 2-->Answers neither question correctly  1c. LOC Commands: 2-->Performs neither task correctly  2. Best Gaze: 2-->Forced deviation, or total gaze paresis not overcome by the oculocephalic maneuver  3. Visual: 2-->Complete hemianopia  4. Facial Palsy: 2-->Partial paralysis (total or near-total paralysis of lower face)  5a. Motor Arm, Left: 0-->No drift, limb holds 90 (or 45) degrees for full 10 secs  5b. Motor Arm, Right: 3-->No effort against gravity, limb falls  6a. Motor Leg, Left: 2-->Some effort against gravity, leg falls to bed by 5 secs, but has some effort against gravity  6b. Motor Leg, Right: 2-->Some effort against gravity, leg falls to bed by 5 secs, but has some effort against gravity  7. Limb " Ataxia: 0-->Absent  8. Sensory: 1-->Mild-to-moderate sensory loss, patient feels pinprick is less sharp or is dull on the affected side, or there is a loss of superficial pain with pinprick, but patient is aware of being touched  9. Best Language: 3-->Mute, global aphasia, no usable speech or auditory comprehension  10. Dysarthria: 2-->Severe dysarthria, patients speech is so slurred as to be unintelligible in the absence of or out of proportion to any dysphasia, or is mute/anarthric  11. Extinction and Inattention (formerly Neglect): 2-->Profound shaina-inattention/extinction more than 1 modality    Total (NIH Stroke Scale): 25      Results Review:    I reviewed the patient's new clinical results.  I reviewed the patient's other test results and agree with the interpretation    Assessment/Plan     Assessment:    Active Hospital Problems    Diagnosis    • **Acute ischemic left MCA stroke s/p IV tPA and mechanical thrombectomy (HCC)    • Persistent atrial fibrillation (HCC)    • Essential hypertension    • Other hyperlipidemia    • Tobacco abuse        81-year-old female with a past medical history significant for hypertension, dyslipidemia, degenerative joint disease, ongoing smoking, and reported history of percutaneous mitral valve repair/replacement in [place].  I also see a notation of hypothyroidism in her electronic chart.    She presented today to her primary care physician with apparent new onset atrial fibrillation with RVR and during transfer to [facility] had acute neurologic changes and was found to have significant clot in the left intracranial carotid artery extending into the left MCA.  She was given TPA at [facility] and transferred emergently here.  She underwent a mechanical thrombectomy by Dr. Dye with a return of TICI 3 flow.  She has significant continued deficits with an NIH stroke scale 25 with right-sided weakness and aphasia primarily.  Mechanism of stroke clearly  embolic.    Plan:    1. Neuro ICU admission  2. Post TPA order set initiated  3. MRI of the brain  4. 24-hour post TPA CT scan at 1741 on 10/27  5. Dysphagia evaluation  6. Statin  7. TTE  8. Keep SBP less than 140  9. Amiodarone drip for A. Fib  10. Cardiology consultation in a.m.    I discussed the patients findings and my recommendations with nursing staff.     Critical Care time spent in direct patient care: 40 minutes (excluding procedure time, if applicable) including high complexity decision making to assess, manipulate, and support vital organ system failure in this individual who has impairment of one or more vital organ systems such that there is a high probability of imminent or life threatening deterioration in the patient’s condition.      Naveen Toney MD  Pulmonary and Critical Care Medicine  10/26/21  19:35 EDT            Electronically signed by Naveen Toney MD at 10/26/21 3138

## 2021-11-01 NOTE — PROGRESS NOTES
"Clinical Nutrition Note      Patient Name: Awilda Niño  MRN: [id number]  Admission date: 10/26/2021      Multidisciplinary Rounds    Additional information obtained during MDR:  RN reports pt extubated over the weekend and made comfort measures; Palliative service following w/ hospice referral made.      Current diet: NPO Diet    Oral Nutrition Supplement:     Pertinent medical data reviewed:  No nutrition risk identified on nursing screen; MST score \"0\"    Average oral intake per documentation:              Intervention:  Plan of care and goals of care reviewed    Monitor:  RD will sign off      Tanya Gonzalez MS,RD,LD  11/01/21 11:42 EDT  Time: 15  mins       "

## 2021-11-01 NOTE — PLAN OF CARE
Goal Outcome Evaluation:She has been comfortable since 1600 transfer.  Family at bedside. All questions & concerns addressed.

## 2021-11-01 NOTE — DISCHARGE SUMMARY
Discharge Summary    Patient name: Awilda Niño  CSN: [id number]  MRN: [id number]  : 1940  Today's date: 2021     Date of Admission: 10/26/2021  Date of Discharge:  2021    Admitting Physician:  Dr. Toney  Primary Care Provider: Bonifacio Brooks MD  Consultations:   Neurology:  Dr. Salazar  Cardiology:  Dr. Kennedy  Palliative Care: Dr. Edwards    Admission Diagnosis:  CVA     Discharge Diagnoses:   Active Hospital Problems    Diagnosis    • **Acute ischemic cardioembolic left MCA stroke s/p IV tPA and mechanical thrombectomy 10/26/2021 ([facility])    • Acute cardioembolic R MCA stroke s/p mechanical thrombectomy 10/28/2021 ([facility])    • Acute hypoxemic/hypercarbic respiratory failure due to neurologic event requiring intubation 10/28/2021 ([facility])    • New onset atrial fibrillation 10/26/2021 ([facility])    • Essential hypertension    • Hyperlipidemia    • Tobacco abuse      Allergies:  Amitriptyline, Cefaclor, Hctz [hydrochlorothiazide], and Tylenol [acetaminophen]    Code Status and Medical Interventions:   Ordered at: 10/31/21 1215     Code Status:    No CPR     Medical Interventions (Level of Support Prior to Arrest):    Comfort Measures     Procedures/Testing:  10/26/21 Mechanical thrombectomy from intracranial LICA extending into the MCA    10/27/21 TTE - Left ventricular ejection fraction appears to be 61 - 65%. Left ventricular systolic function is normal.  Left ventricular diastolic function is consistent with (grade III w/high LAP) restrictive pattern.  There is a bioprosthetic mitral valve present. With mildly elevated transvalvular gradient.  Estimated right ventricular systolic pressure from tricuspid regurgitation is mildly elevated (35-45 mmHg).  Saline test results are negative.    10/28/21 IA tPA into the JARET and mechanical thrombectomy from M2 of the right MCA as well as the A2 segment of the right VAMSI.    History of Present Illness:  81-year-old female transferred from [facility] this evening.  She has a past medical history significant for hypertension, dyslipidemia, degenerative joint disease, percutaneous mitral valve repair/replacement in [place] in the past, and ongoing smoking.     The patient presented to her primary care physician earlier today and was diagnosed with new onset atrial fibrillation with RVR.  She was sent to [facility] for further evaluation and wall and root had acute neurologic changes.  On arrival to their facility she was noted to have left gaze preference, right hemiparesis, and global aphasia.  NIH stroke scale was measured at 30.  She underwent a CT of the head without contrast which was negative for hemorrhage.  TPA was initiated at 1641.  She underwent a CT of the head and neck and a CT perfusion scan which revealed acute occlusion of her left ICA extending into the MCA and a small amount of core infarct with significant penumbra.  She was emergently transferred to our facility and went immediately to the catheterization lab.  She underwent mechanical thrombectomy with TICI 3 flow.  Mechanism of her stroke was felt to be cardioembolic secondary to atrial fibrillation.     Hospital Course:  The following day, she developed A fib with RVR and was started on IV amiodarone and IV metoprolol with eventual conversion to NSR.  Amio was converted from IV to PO.  Even though she remained aphasic, she passed a dysphagia evaluation, followed commands with right sided weakness improvement.  Repeat CTH revealed evolving left MCA infarct with some edema, but no hemorrhage.  She was on ASA with plans to start anticoagulation 7 days post stroke.  However, on 10/28, she had sudden onset of AMS, left sided weakness and left hemiparesis which was contralateral to symptoms on admission.  She was intubated for airway protection.  She had a repeat angiogram and underwent thrombectomy from right MCA and right VAMSI.  Despite intervention, she did not improve and  "remained obtunded on mechanical ventilation.  After discussion with the family, palliative care was consulted.  Family wished to pursue palliative extubation and comfort measures.  Hospice was consulted.  They have accepted her and will assume her care.    Vitals:  /61 (BP Location: Left arm, Patient Position: Lying)   Pulse 78   Temp 98.2 °F (36.8 °C) (Axillary)   Resp 16   Ht 167.6 cm (66\")   Wt 65 kg (143 lb 4.8 oz)   SpO2 91%   BMI 23.13 kg/m²     Physical Exam:  GENERAL : Elderly frail female lying in bed no acute distress  RESPIRATORY/THORAX : normal respiratory effort and no intercostal retractions, managed breath sounds bilaterally  CARDIOVASCULAR : Normal S1/S2, RRR.  No lower ext edema.  GASTROINTESTINAL : Soft, NT/ND. BS x 4 normoactive. No hepatosplenomegaly.  MUSCULOSKELETAL : No cyanosis, clubbing, or ischemia  NEUROLOGICAL: Unresponsive    Interval:  (handoff)  1a. Level of Consciousness: 2-->Not alert, requires repeated stimulation to attend, or is obtunded and requires strong or painful stimulation to make movements (not stereotyped)  1b. LOC Questions: 1-->Answers one question correctly  1c. LOC Commands: 2-->Performs neither task correctly  2. Best Gaze: 2-->Forced deviation, or total gaze paresis not overcome by the oculocephalic maneuver  3. Visual: 2-->Complete hemianopia  4. Facial Palsy: 2-->Partial paralysis (total or near-total paralysis of lower face)  5a. Motor Arm, Left: 3-->No effort against gravity, limb falls  5b. Motor Arm, Right: 2-->Some effort against gravity, limb cannot get to or maintain (if cued) 90 (or 45) degrees, drifts down to bed, but has some effort against gravity  6a. Motor Leg, Left: 3-->No effort against gravity, leg falls to bed immediately  6b. Motor Leg, Right: 3-->No effort against gravity, leg falls to bed immediately  7. Limb Ataxia: 0-->Absent  8. Sensory: 1-->Mild-to-moderate sensory loss, patient feels pinprick is less sharp or is dull on the " affected side, or there is a loss of superficial pain with pinprick, but patient is aware of being touched  9. Best Language: 3-->Mute, global aphasia, no usable speech or auditory comprehension  10. Dysarthria: (UN) Intubated or other physical barrier  11. Extinction and Inattention (formerly Neglect): 1-->Visual, tactile, auditory, spatial, or personal inattention or extinction to bilateral simultaneous stimulation in one of the sensory modalities    Total (NIH Stroke Scale): 27    Labs:  Results from last 7 days   Lab Units 10/29/21  0402   WBC 10*3/mm3 19.24*   HEMOGLOBIN g/dL 11.3*   HEMATOCRIT % 33.2*   PLATELETS 10*3/mm3 103*     Results from last 7 days   Lab Units 10/29/21  0401   SODIUM mmol/L 141   POTASSIUM mmol/L 4.0   CHLORIDE mmol/L 112*   CO2 mmol/L 17.0*   BUN mg/dL 20   CREATININE mg/dL 0.64   CALCIUM mg/dL 8.2*   BILIRUBIN mg/dL 0.6   ALK PHOS U/L 67   ALT (SGPT) U/L 14   AST (SGOT) U/L 26   GLUCOSE mg/dL 116*         No results found for: MG, PHOS           Discharge Medications:  Per Hospice    Discharge Instructions:  Ok to go to Hospice     MABLE Graff, AGACNP-BC, FNP-BC  Pulmonary & Critical Care Medicine    Time: I spent  45  minutes on this discharge activity which included: face-to-face encounter with the patient, reviewing the data in the system, coordination of the care with the nursing staff as well as consultants, documentation, and entering orders.      CC: Bonifacio Brooks MD

## 2021-11-01 NOTE — THERAPY DISCHARGE NOTE
Acute Care - Occupational Therapy Discharge  [facility]    Patient Name: Awilda Niño  : 1940    MRN: [id number]                              Today's Date: 2021       Admit Date: 10/26/2021    Visit Dx:     ICD-10-CM ICD-9-CM   1. Aphasia  R47.01 784.3   2. Dysphagia, unspecified type  R13.10 787.20     Patient Active Problem List   Diagnosis   • Acute ischemic cardioembolic left MCA stroke s/p IV tPA and mechanical thrombectomy 10/26/2021 ([facility])   • New onset atrial fibrillation 10/26/2021 ([facility])   • Essential hypertension   • Hyperlipidemia   • Tobacco abuse   • Age related osteoporosis   • Allergic rhinitis   • Chronic obstructive lung disease ([facility])   • Chronic pain disorder   • Coronary arteriosclerosis   • Hypothyroidism   • Non-rheumatic mitral valve disease   • Hyperlipidemia   • Acute cardioembolic R MCA stroke s/p mechanical thrombectomy 10/28/2021 ([facility])   • Acute hypoxemic/hypercarbic respiratory failure due to neurologic event requiring intubation 10/28/2021 ([facility])     Past Medical History:   Diagnosis Date   • Atrial fibrillation ([facility])    • COPD (chronic obstructive pulmonary disease) ([facility])    • Disease of thyroid gland    • GERD (gastroesophageal reflux disease)    • Hyperlipidemia    • Hypertension    • Insomnia    • Stroke ([facility])      Past Surgical History:   Procedure Laterality Date   • INTERVENTIONAL RADIOLOGY PROCEDURE N/A 10/26/2021    Procedure: IR MECHANICAL THROMBECTOMY - PRIMARY;  Surgeon: Fritz Dye MD;  Location:  [facility] CATH INVASIVE LOCATION;  Service: Interventional Radiology;  Laterality: N/A;   • INTERVENTIONAL RADIOLOGY PROCEDURE Bilateral 10/28/2021    Procedure: CAROTID CEREBRAL ANGIOGRAM BILATERAL;  Surgeon: Fritz Dye MD;  Location:  [facility] CATH INVASIVE LOCATION;  Service: Interventional Radiology;  Laterality: Bilateral;   • MITRAL VALVE REPLACEMENT        General Information    No documentation.                Mobility/ADL's    No documentation.                 Obj/Interventions    No documentation.                Goals/Plan     Row Name 11/01/21 1319          Bed Mobility Goal 1 (OT)    Progress/Outcomes (Bed Mobility Goal 1, OT) medical status inhibited participation  -MR     Row Name 11/01/21 1319          Transfer Goal 1 (OT)    Progress/Outcome (Transfer Goal 1, OT) medical status inhibited participation  -MR     Row Name 11/01/21 1319          Dressing Goal 1 (OT)    Progress/Outcome (Dressing Goal 1, OT) medical status inhibited participation  -MR           User Key  (r) = Recorded By, (t) = Taken By, (c) = Cosigned By    Initials Name Provider Type    Wendie Munguia, OT Occupational Therapist               Clinical Impression    No documentation.                Outcome Measures     Row Name 11/01/21 1322          How much help from another is currently needed...    Putting on and taking off regular lower body clothing? 1  -MR     Bathing (including washing, rinsing, and drying) 1  -MR     Toileting (which includes using toilet bed pan or urinal) 1  -MR     Putting on and taking off regular upper body clothing 1  -MR     Taking care of personal grooming (such as brushing teeth) 1  -MR     Eating meals 1  -MR     AM-PAC 6 Clicks Score (OT) 6  -MR     Row Name 11/01/21 0800          How much help from another person do you currently need...    Turning from your back to your side while in flat bed without using bedrails? 1  -AB     Moving from lying on back to sitting on the side of a flat bed without bedrails? 1  -AB     Moving to and from a bed to a chair (including a wheelchair)? 1  -AB     Standing up from a chair using your arms (e.g., wheelchair, bedside chair)? 1  -AB     Climbing 3-5 steps with a railing? 1  -AB     To walk in hospital room? 1  -AB     AM-PAC 6 Clicks Score (PT) 6  -AB     Row Name 11/01/21 1322          Modified Welling Scale    Modified Welling Scale 5 - Severe disability.  Bedridden, incontinent, and requiring constant nursing care and  attention.  -MR     Row Name 11/01/21 1322          Functional Assessment    Outcome Measure Options AM-PAC 6 Clicks Daily Activity (OT)  -MR           User Key  (r) = Recorded By, (t) = Taken By, (c) = Cosigned By    Initials Name Provider Type    Aminata Contreras, RN Registered Nurse    Wendie Munguia, OT Occupational Therapist              Occupational Therapy Education                 Title: PT OT SLP Therapies (In Progress)     Topic: Occupational Therapy (In Progress)     Point: ADL training (In Progress)     Description:   Instruct learner(s) on proper safety adaptation and remediation techniques during self care or transfers.   Instruct in proper use of assistive devices.              Learning Progress Summary           Patient Nonacceptance, E,TB, NL by  at 10/31/2021 0119    Acceptance, E, VU,DU by MR at 10/28/2021 1425    Acceptance, E, NR by MR at 10/27/2021 1536   Significant Other Acceptance, E, NR by MR at 10/27/2021 1536                   Point: Home exercise program (In Progress)     Description:   Instruct learner(s) on appropriate technique for monitoring, assisting and/or progressing therapeutic exercises/activities.              Learning Progress Summary           Patient Nonacceptance, E,TB, NL by  at 10/31/2021 0119    Acceptance, E, VU,DU by MR at 10/28/2021 1425    Acceptance, E, NR by MR at 10/27/2021 1536   Significant Other Acceptance, E, NR by MR at 10/27/2021 1536                   Point: Precautions (In Progress)     Description:   Instruct learner(s) on prescribed precautions during self-care and functional transfers.              Learning Progress Summary           Patient Nonacceptance, E,TB, NL by  at 10/31/2021 0119    Acceptance, E, VU,DU by MR at 10/28/2021 1425    Acceptance, E, NR by MR at 10/27/2021 1536   Significant Other Acceptance, E, NR by MR at 10/27/2021 1536                   Point: Body mechanics (In Progress)     Description:   Instruct learner(s) on  proper positioning and spine alignment during self-care, functional mobility activities and/or exercises.              Learning Progress Summary           Patient Nonacceptance, E,TB, NL by  at 10/31/2021 0119    Acceptance, E, VU,DU by MR at 10/28/2021 1425    Acceptance, E, NR by MR at 10/27/2021 1536   Significant Other Acceptance, E, NR by MR at 10/27/2021 1536                               User Key     Initials Effective Dates Name Provider Type Discipline     06/16/21 -  Marian Fernandez RN Registered Nurse Nurse     10/06/21 -  Vashti Hernandez OT Occupational Therapist OT              OT Recommendation and Plan  Retired Outcome Summary/Treatment Plan (OT)  Anticipated Discharge Disposition (OT): other (see comments) (Comfort Care Only)  Planned Therapy Interventions (OT): activity tolerance training, adaptive equipment training, BADL retraining, functional balance retraining, occupation/activity based interventions, strengthening exercise, ROM/therapeutic exercise, transfer/mobility retraining, patient/caregiver education/training  Therapy Frequency (OT): daily  Plan of Care Review  Plan of Care Reviewed With: patient, spouse  Progress: improving  Outcome Summary: Pt lethargic this session. Pt continues to be limited by global aphasia/cognition. Functional improvement noted with transfers and following of sequencing/tasks with v/t & t/c this date. Recommendation upon d/c - IP Rehab.  Plan of Care Reviewed With: patient, spouse  Outcome Summary: Pt lethargic this session. Pt continues to be limited by global aphasia/cognition. Functional improvement noted with transfers and following of sequencing/tasks with v/t & t/c this date. Recommendation upon d/c - IP Rehab.     Time Calculation:              VASHTI HERNANDEZ OT  11/1/2021

## 2021-11-01 NOTE — CASE MANAGEMENT/SOCIAL WORK
Continued Stay Note  [facility]     Patient Name: Awilda Niño  MRN: [id number]  Today's Date: 11/1/2021    Admit Date: 10/26/2021     Discharge Plan     Row Name 11/01/21 1108       Plan    Plan TBD    Plan Comments Discussed patient in MDR.  Patient was extubated yesterday, 10/31.  Now comfort care.  Hospice consulted.  CM will continue to follow.  Diamond Brennan RN [phone]    Final Discharge Disposition Code 30 - still a patient               Discharge Codes    No documentation.               Expected Discharge Date and Time     Expected Discharge Date Expected Discharge Time    Nov 3, 2021             Diamond Brennan RN

## 2021-11-02 NOTE — PROGRESS NOTES
Continued Stay Note  [facility]     Patient Name: Awilda Niño  MRN: [id number]  Today's Date: 11/2/2021    Admit Date: 11/1/2021     Discharge Plan     Row Name 11/02/21 1032       Plan    Plan Remain Inpatient Care    Plan Comments [place] 82yo pps 10%  female with terminal dx of cva.  Assess completed this am.  Patient noted lying in hospital bed. She is unresponsive. Relaxed face, jaw, body posture. Respirations even, unlabored.  After assessment completed noted respirations increased in rate and became slightly labored at times.  RN discussed with her son Ed that patient in need of prn dose of morphine to prevent further increase in labored respirations.  He verbalized agreement and understanding. RN discussed assessment, eol care, what to possibly expect from this point further, eol signs/symptoms, medications, food and water, safety, self care.  Rn offered ongoing support, open line of communication, continuous availability.  Ed verbalized understanding, appreciation of care.  RN updated patient’s [facility] RN BHAVANA [place].  He relays will give prn morphine dose.  The patient continue to remain gip appropriate as she continues to require complex technical injectable medication delivery requiring frequent skilled nursing assessment, intervention, reevaluation.  She is actively dying requiring frequent nursing and physician care. Current level of care unable to be provided in alternate setting.  Should the patient’s condition remain on current trajectory she will not be expected to survive this hospitalization.               Discharge Codes    No documentation.                     Shauna Vera

## 2021-11-02 NOTE — PLAN OF CARE
Goal Outcome Evaluation:  Plan of Care Reviewed With: family        Progress: declining  Outcome Summary: comfort measures continued

## 2021-11-02 NOTE — PLAN OF CARE
Goal Outcome Evaluation:Family kept aware of events and changes, understandably somber at this time.  Awilda is beginning to show [place] respiratory pattern now.  She remains comfortable & calm.  Monitor for changes.

## 2021-11-03 NOTE — SIGNIFICANT NOTE
Exam confirms with auscultation no audible heart tones, no breath sounds, Death Pronounced at 1018 on 11/3/2021.  Lizabeth Kauffman RN, [facility]

## 2021-11-03 NOTE — PROGRESS NOTES
Continued Stay Note   [facility]     Patient Name: Awilda Niño  MRN: [id number]  Today's Date: 11/3/2021    Admit Date: 11/1/2021     Discharge Plan     Row Name 11/03/21 0837       Plan    Plan Inpatient hospice    Plan Comments Patient resting in bed with eyes closed.  Face, jaw, and body relaxed.  Breathing even, tachypneic, 32/min.  Skin warm to upper body.  Feet and lower legs cool, no mottling noted.  Prn Toradol given for fever and prn Morphine for tachypnea.  Spouse at bedside.  Discussed assessment with him.  Made him aware patient has hours to days left and verbalizes understanding.  He is appropriately tearful.               Discharge Codes    No documentation.                     Kezia Bello RN

## 2021-11-03 NOTE — DISCHARGE SUMMARY
Date of Death:  11/3/2021  Time of Death:  1018    Presenting Problem/History of Present Illness    CVA (cerebral vascular accident) (HCC)      Hospital Course    Per Intensivist Discharge Summary:     History of Present Illness:  81-year-old female transferred from [facility] this evening.  She has a past medical history significant for hypertension, dyslipidemia, degenerative joint disease, percutaneous mitral valve repair/replacement in [place] in the past, and ongoing smoking.     The patient presented to her primary care physician earlier today and was diagnosed with new onset atrial fibrillation with RVR.  She was sent to [facility] for further evaluation and wall and root had acute neurologic changes.  On arrival to their facility she was noted to have left gaze preference, right hemiparesis, and global aphasia.  NIH stroke scale was measured at 30.  She underwent a CT of the head without contrast which was negative for hemorrhage.  TPA was initiated at 1641.  She underwent a CT of the head and neck and a CT perfusion scan which revealed acute occlusion of her left ICA extending into the MCA and a small amount of core infarct with significant penumbra.  She was emergently transferred to our facility and went immediately to the catheterization lab.  She underwent mechanical thrombectomy with TICI 3 flow.  Mechanism of her stroke was felt to be cardioembolic secondary to atrial fibrillation.      Hospital Course:  The following day, she developed A fib with RVR and was started on IV amiodarone and IV metoprolol with eventual conversion to NSR.  Amio was converted from IV to PO.  Even though she remained aphasic, she passed a dysphagia evaluation, followed commands with right sided weakness improvement.  Repeat CTH revealed evolving left MCA infarct with some edema, but no hemorrhage.  She was on ASA with plans to start anticoagulation 7 days post stroke.  However, on 10/28, she had sudden  onset of AMS, left sided weakness and left hemiparesis which was contralateral to symptoms on admission.  She was intubated for airway protection.  She had a repeat angiogram and underwent thrombectomy from right MCA and right VAMSI.  Despite intervention, she did not improve and remained obtunded on mechanical ventilation.  After discussion with the family, palliative care was consulted.  Family wished to pursue palliative extubation and comfort measures.  Hospice was consulted.  They have accepted her and will assume her care.     [end of copied text]     Mrs. Niño was admitted to [facility] on 11/1/2021 for mgmt of acute symptoms 2/2 CVA.     Social History:  Social History     Tobacco Use   • Smoking status: Current Every Day Smoker     Packs/day: 0.50   • Smokeless tobacco: Never Used   Substance Use Topics   • Alcohol use: Not on file         Consults:   Consults     Date and Time Order Name Status Description    10/29/2021  2:32 PM Inpatient Palliative Care MD Consult Completed     10/27/2021 12:35 AM Inpatient Cardiology Consult Completed     10/26/2021  6:02 PM Inpatient Neurology Consult Stroke Completed         Exam confirms with auscultation no audible heart tones, no breath sounds, Death Pronounced at 1018 on 11/3/2021.  Lizabeth Kauffman RN, [facility]      Tatiana Bello, DNP, MHA, APRN  [facility]  Hospice and Palliative Care Nurse Practitioner  11/03/21  11:18 EDT

## 2021-11-03 NOTE — PROGRESS NOTES
"Hospice Progress Note    Date of Admission: 11/1/2021    Subjective:             Advance Care Planning Activity      Questions for Current Code Status     Question Answer    Code Status (Patient has no pulse and is not breathing) No CPR (Do Not Attempt to Resuscitate)    Medical Interventions (Patient has pulse or is breathing) Comfort Measures        Scheduled Meds:furosemide, 20 mg, Intravenous, Q6H  LORazepam, 0.5 mg, Intravenous, Q4H  Morphine, 6 mg, Intravenous, Q4H  palliative care oral rinse, , Mouth/Throat, Q6H  Scopolamine, 1 patch, Transdermal, Q72H      Continuous Infusions:   PRN Meds:.•  acetaminophen  •  bisacodyl  •  furosemide  •  glycopyrrolate  •  haloperidol lactate  •  ketorolac  •  LORazepam  •  LORazepam  •  Morphine  •  ondansetron  •  palliative care oral rinse  •  polyvinyl alcohol  •  Scopolamine      Objective: Ht 167.6 cm (65.98\")   Wt 64.9 kg (143 lb)   BMI 23.09 kg/m²      Intake/Output Summary (Last 24 hours) at 11/2/2021 2221  Last data filed at 11/2/2021 1500  Gross per 24 hour   Intake --   Output 1250 ml   Net -1250 ml     Physical Exam:               Results from last 7 days   Lab Units 10/29/21  0402   WBC 10*3/mm3 19.24*   HEMOGLOBIN g/dL 11.3*   HEMATOCRIT % 33.2*   PLATELETS 10*3/mm3 103*     Results from last 7 days   Lab Units 10/29/21  0401   SODIUM mmol/L 141   POTASSIUM mmol/L 4.0   CHLORIDE mmol/L 112*   CO2 mmol/L 17.0*   BUN mg/dL 20   CREATININE mg/dL 0.64   CALCIUM mg/dL 8.2*   BILIRUBIN mg/dL 0.6   ALK PHOS U/L 67   ALT (SGPT) U/L 14   AST (SGOT) U/L 26   GLUCOSE mg/dL 116*       Impression:         Plan:         Barbara Voss MD  11/02/21      "

## 2022-01-27 ENCOUNTER — CALL CENTER PROGRAMS (OUTPATIENT)
Dept: CALL CENTER | Facility: HOSPITAL | Age: 82
End: 2022-01-27

## 2022-01-27 NOTE — OUTREACH NOTE
Stroke Jaclyn Survey      Responses   Facility patient discharged from? [facility]   Attempt successful No   Revoke    Patient  score    [facility] [place] Score 6          Kavita Field RN

## (undated) DEVICE — ROTATING HEMOSTATIC VALVE .096": Brand: RHV

## (undated) DEVICE — CATH SELCT NEURON BER 5F 120CM

## (undated) DEVICE — STPCK LP 1WY RA 200PSI

## (undated) DEVICE — ST INF PRI SMRTSTE 20DRP 2VLV 24ML 117

## (undated) DEVICE — SYS CATH GUIDE BALN WALRUS 8F .087IN 95CM

## (undated) DEVICE — CATH MIC PHENOM .021 .018IN 160CM

## (undated) DEVICE — 2 TIP PRE-SHAPED 45 MICROCATHETER: Brand: EXCELSIOR SL-10

## (undated) DEVICE — MICROCATHETER: Brand: TREVO TRAK 21

## (undated) DEVICE — LEX NEURO ANGIOGRAPHY: Brand: MEDLINE INDUSTRIES, INC.

## (undated) DEVICE — NEURO GUIDEWIRE WITH HYDROPHILIC COATING: Brand: SYNCHRO 14

## (undated) DEVICE — ST EXT IV SMARTSITE 2VLV SP M LL 5ML IV1

## (undated) DEVICE — RADIFOCUS TORQUE DEVICE MULTI-TORQUE VISE: Brand: RADIFOCUS TORQUE DEVICE

## (undated) DEVICE — RADIFOCUS GLIDEWIRE: Brand: GLIDEWIRE

## (undated) DEVICE — STPCK 3/WY HP M/RA W/OFF/HNDL 1050PSI STRL

## (undated) DEVICE — BALLOON GUIDE CATHETER: Brand: FLOWGATE2

## (undated) DEVICE — ST ACC MICROPUNCTURE .018 TRANSLSS/SS/TP 5F/10CM 21G/7CM

## (undated) DEVICE — CATH TEMPO 5F BER 100CM: Brand: TEMPO

## (undated) DEVICE — Device

## (undated) DEVICE — GUIDEWIRE WITH ICE™ HYDROPHILIC COATING: Brand: TRANSEND™ EX

## (undated) DEVICE — LIMB HOLDER, WRIST/ANKLE: Brand: DEROYAL

## (undated) DEVICE — PROVUE RETRIEVER: Brand: TREVO NXT

## (undated) DEVICE — ANGIO-SEAL VIP VASCULAR CLOSURE DEVICE: Brand: ANGIO-SEAL

## (undated) DEVICE — INTRO SHEATH ENGAGE W/50 GW .038 8F12